# Patient Record
Sex: MALE | Race: WHITE | Employment: FULL TIME | URBAN - METROPOLITAN AREA
[De-identification: names, ages, dates, MRNs, and addresses within clinical notes are randomized per-mention and may not be internally consistent; named-entity substitution may affect disease eponyms.]

---

## 2018-04-17 ENCOUNTER — LAB ENCOUNTER (OUTPATIENT)
Dept: LAB | Age: 43
End: 2018-04-17
Attending: FAMILY MEDICINE
Payer: COMMERCIAL

## 2018-04-17 ENCOUNTER — APPOINTMENT (OUTPATIENT)
Dept: LAB | Age: 43
End: 2018-04-17
Attending: FAMILY MEDICINE
Payer: COMMERCIAL

## 2018-04-17 ENCOUNTER — HOSPITAL ENCOUNTER (OUTPATIENT)
Dept: GENERAL RADIOLOGY | Age: 43
Discharge: HOME | End: 2018-04-17
Attending: FAMILY MEDICINE
Payer: COMMERCIAL

## 2018-04-17 ENCOUNTER — OFFICE VISIT (OUTPATIENT)
Dept: FAMILY MEDICINE CLINIC | Facility: CLINIC | Age: 43
End: 2018-04-17

## 2018-04-17 VITALS
DIASTOLIC BLOOD PRESSURE: 110 MMHG | HEIGHT: 72 IN | WEIGHT: 273.63 LBS | BODY MASS INDEX: 37.06 KG/M2 | HEART RATE: 78 BPM | SYSTOLIC BLOOD PRESSURE: 162 MMHG | RESPIRATION RATE: 16 BRPM | TEMPERATURE: 98 F

## 2018-04-17 DIAGNOSIS — G47.33 OBSTRUCTIVE SLEEP APNEA SYNDROME: ICD-10-CM

## 2018-04-17 DIAGNOSIS — I10 ESSENTIAL HYPERTENSION: ICD-10-CM

## 2018-04-17 DIAGNOSIS — Z99.89 OSA ON CPAP: ICD-10-CM

## 2018-04-17 DIAGNOSIS — R73.9 HYPERGLYCEMIA: ICD-10-CM

## 2018-04-17 DIAGNOSIS — E66.01 SEVERE OBESITY (BMI 35.0-35.9 WITH COMORBIDITY) (HCC): ICD-10-CM

## 2018-04-17 DIAGNOSIS — G47.33 OSA ON CPAP: ICD-10-CM

## 2018-04-17 DIAGNOSIS — G43.009 MIGRAINE WITHOUT AURA AND WITHOUT STATUS MIGRAINOSUS, NOT INTRACTABLE: ICD-10-CM

## 2018-04-17 DIAGNOSIS — E78.2 MIXED HYPERLIPIDEMIA: ICD-10-CM

## 2018-04-17 DIAGNOSIS — I10 ESSENTIAL HYPERTENSION: Primary | ICD-10-CM

## 2018-04-17 PROCEDURE — 93010 ELECTROCARDIOGRAM REPORT: CPT | Performed by: FAMILY MEDICINE

## 2018-04-17 PROCEDURE — 99212 OFFICE O/P EST SF 10 MIN: CPT | Performed by: FAMILY MEDICINE

## 2018-04-17 PROCEDURE — 83036 HEMOGLOBIN GLYCOSYLATED A1C: CPT | Performed by: FAMILY MEDICINE

## 2018-04-17 PROCEDURE — 99215 OFFICE O/P EST HI 40 MIN: CPT | Performed by: FAMILY MEDICINE

## 2018-04-17 PROCEDURE — 80053 COMPREHEN METABOLIC PANEL: CPT

## 2018-04-17 PROCEDURE — 71046 X-RAY EXAM CHEST 2 VIEWS: CPT | Performed by: FAMILY MEDICINE

## 2018-04-17 PROCEDURE — 85025 COMPLETE CBC W/AUTO DIFF WBC: CPT

## 2018-04-17 PROCEDURE — 36415 COLL VENOUS BLD VENIPUNCTURE: CPT

## 2018-04-17 PROCEDURE — 81003 URINALYSIS AUTO W/O SCOPE: CPT

## 2018-04-17 PROCEDURE — 93005 ELECTROCARDIOGRAM TRACING: CPT

## 2018-04-17 RX ORDER — LISINOPRIL AND HYDROCHLOROTHIAZIDE 20; 12.5 MG/1; MG/1
1 TABLET ORAL DAILY
Qty: 30 TABLET | Refills: 3 | Status: SHIPPED | OUTPATIENT
Start: 2018-04-17 | End: 2018-05-15

## 2018-04-17 NOTE — PATIENT INSTRUCTIONS
DIET TIPS    Must try and decrease white flour products and carbohydrates such as less potatos, rice, tortillas, bread, pasta, etc. Eat smaller portions and avoid eating late at night, especially before bed.  Try t

## 2018-04-17 NOTE — PROGRESS NOTES
Patient ID: Wander Rob is a 37year old male. HPI  Patient presents with:  Blood Pressure  I have not seen him since 2016. He states he is not on any medications. He states they had an exam at work 10 days ago and his blood pressure was elevated.  10/18/2016   CO2 25 10/18/2016         Lab Results  Component Value Date    (H) 10/18/2016   BUN 18 10/18/2016   CREATSERUM 1.14 10/18/2016   BUNCREA 15.8 10/18/2016   ANIONGAP 7 10/18/2016   GFRAA >60 10/18/2016   GFRNAA >60 10/18/2016   C chest tightness and shortness of breath. Cardiovascular: Negative for chest pain, palpitations and leg swelling. Neurological: Positive for headaches (has migraines).          Past Medical History:   Diagnosis Date   • Heart murmur        History revie Oral Tab; Take 1 tablet by mouth daily. For high blood pressure  Let start him on Zestoretic. He would do labs me today along with a chest x-ray and EKG. He does not need to fast at this time as I would rather just get labs and start him on medication. (if given) as prescribed. Approach to treatment discussed and patient/family member understands and agrees to plan. We will decide on follow-up after I get the results back.     Alvina Thompson,   4/17/2018

## 2018-05-10 ENCOUNTER — PATIENT MESSAGE (OUTPATIENT)
Dept: FAMILY MEDICINE CLINIC | Facility: CLINIC | Age: 43
End: 2018-05-10

## 2018-05-11 NOTE — TELEPHONE ENCOUNTER
From: Emi Oshea  To: Genny Pagan DO  Sent: 5/10/2018 11:42 AM CDT  Subject: Visit Follow-up Question    I have an appointment next week. I am supposed to get lab work done before. Do I need anything or is something on file at the lab?

## 2018-05-12 ENCOUNTER — LAB ENCOUNTER (OUTPATIENT)
Dept: LAB | Age: 43
End: 2018-05-12
Attending: FAMILY MEDICINE
Payer: COMMERCIAL

## 2018-05-12 DIAGNOSIS — E78.2 MIXED HYPERLIPIDEMIA: ICD-10-CM

## 2018-05-12 DIAGNOSIS — I10 ESSENTIAL HYPERTENSION: ICD-10-CM

## 2018-05-12 PROCEDURE — 80061 LIPID PANEL: CPT

## 2018-05-12 PROCEDURE — 80048 BASIC METABOLIC PNL TOTAL CA: CPT

## 2018-05-12 PROCEDURE — 36415 COLL VENOUS BLD VENIPUNCTURE: CPT

## 2018-05-15 ENCOUNTER — OFFICE VISIT (OUTPATIENT)
Dept: FAMILY MEDICINE CLINIC | Facility: CLINIC | Age: 43
End: 2018-05-15

## 2018-05-15 VITALS
BODY MASS INDEX: 36.16 KG/M2 | DIASTOLIC BLOOD PRESSURE: 78 MMHG | TEMPERATURE: 99 F | SYSTOLIC BLOOD PRESSURE: 108 MMHG | HEART RATE: 83 BPM | HEIGHT: 72 IN | WEIGHT: 267 LBS

## 2018-05-15 DIAGNOSIS — E66.01 SEVERE OBESITY (BMI 35.0-35.9 WITH COMORBIDITY) (HCC): ICD-10-CM

## 2018-05-15 DIAGNOSIS — R73.9 HYPERGLYCEMIA: Primary | ICD-10-CM

## 2018-05-15 DIAGNOSIS — I10 ESSENTIAL HYPERTENSION: ICD-10-CM

## 2018-05-15 DIAGNOSIS — E78.2 MIXED HYPERLIPIDEMIA: ICD-10-CM

## 2018-05-15 PROCEDURE — 99212 OFFICE O/P EST SF 10 MIN: CPT | Performed by: FAMILY MEDICINE

## 2018-05-15 PROCEDURE — 99214 OFFICE O/P EST MOD 30 MIN: CPT | Performed by: FAMILY MEDICINE

## 2018-05-15 RX ORDER — LISINOPRIL 20 MG/1
20 TABLET ORAL DAILY
Qty: 30 TABLET | Refills: 3 | Status: SHIPPED | OUTPATIENT
Start: 2018-05-15 | End: 2018-06-15

## 2018-05-15 NOTE — PROGRESS NOTES
Patient ID: Ariane Coffey is a 37year old male. HPI  Patient presents with: Follow - Up  Sugars in the prediabetic area. He is already losing weight though. He will be calling the sleep apnea placed to get a new mask.     He states that the [facility]  05/12/2018   K 4.3 05/12/2018    05/12/2018   CO2 28 05/12/2018   OSMOCALC 291 05/12/2018         Lab Results  Component Value Date   COLORUR Yellow 04/17/2018   CLARITY Clear 04/17/2018   SPECGRAVITY 1.018 04/17/2018   GLUUR Negative 04/17/ cough and shortness of breath. Cardiovascular: Negative for chest pain, palpitations and leg swelling. Past Medical History:   Diagnosis Date   • Heart murmur        History reviewed. No pertinent surgical history.        Current Outpatient Presc start going to the gym and exercising. Try to avoid too many carbohydrates. Severe obesity (BMI 35.0-35.9 with comorbidity) ([facility] [address]    Reviewed all the labs at length.     Referrals (if applicable)  No orders of the defined types were placed in this encount

## 2018-05-17 NOTE — PROGRESS NOTES
jean paul message with MD recommendation sent to pt.   RACHAEL    6/15/2018  8:40 AM    Deshawn Hager DO         [facility]

## 2018-06-09 ENCOUNTER — APPOINTMENT (OUTPATIENT)
Dept: LAB | Age: 43
End: 2018-06-09
Attending: FAMILY MEDICINE
Payer: COMMERCIAL

## 2018-06-09 DIAGNOSIS — I10 ESSENTIAL HYPERTENSION: ICD-10-CM

## 2018-06-09 PROCEDURE — 80048 BASIC METABOLIC PNL TOTAL CA: CPT

## 2018-06-09 PROCEDURE — 36415 COLL VENOUS BLD VENIPUNCTURE: CPT

## 2018-06-15 ENCOUNTER — OFFICE VISIT (OUTPATIENT)
Dept: FAMILY MEDICINE CLINIC | Facility: CLINIC | Age: 43
End: 2018-06-15

## 2018-06-15 VITALS
DIASTOLIC BLOOD PRESSURE: 83 MMHG | SYSTOLIC BLOOD PRESSURE: 118 MMHG | WEIGHT: 269.38 LBS | TEMPERATURE: 99 F | HEART RATE: 83 BPM | BODY MASS INDEX: 36.49 KG/M2 | HEIGHT: 72 IN | RESPIRATION RATE: 16 BRPM

## 2018-06-15 DIAGNOSIS — G47.33 OBSTRUCTIVE SLEEP APNEA SYNDROME: ICD-10-CM

## 2018-06-15 DIAGNOSIS — I10 ESSENTIAL HYPERTENSION: Primary | ICD-10-CM

## 2018-06-15 DIAGNOSIS — E66.01 SEVERE OBESITY (BMI 35.0-35.9 WITH COMORBIDITY) (HCC): ICD-10-CM

## 2018-06-15 DIAGNOSIS — E78.2 MIXED HYPERLIPIDEMIA: ICD-10-CM

## 2018-06-15 PROCEDURE — 99214 OFFICE O/P EST MOD 30 MIN: CPT | Performed by: FAMILY MEDICINE

## 2018-06-15 PROCEDURE — 99212 OFFICE O/P EST SF 10 MIN: CPT | Performed by: FAMILY MEDICINE

## 2018-06-15 RX ORDER — LISINOPRIL 20 MG/1
20 TABLET ORAL DAILY
Qty: 90 TABLET | Refills: 1 | Status: SHIPPED | OUTPATIENT
Start: 2018-06-15 | End: 2019-02-18

## 2018-06-15 NOTE — PROGRESS NOTES
Patient ID: Silvia Nesbitt is a 37year old male. HPI  Patient presents with:  Hypertension    He is doing wonderful with lisinopril. He has no cough. He and his wife are both trying to work on eating better and living a healthy lifestyle.   He is try 06/09/2018         Lab Results  Component Value Date   COLORUR Yellow 04/17/2018   CLARITY Clear 04/17/2018   SPECGRAVITY 1.018 04/17/2018   GLUUR Negative 04/17/2018   BILUR Negative 04/17/2018   KETUR Negative 04/17/2018   BLOODURINE Negative 04/17/2018 20 MG Oral Tab Take 1 tablet (20 mg total) by mouth daily.  For high blood pressure Disp: 30 tablet Rfl: 3     Allergies:No Known Allergies   PHYSICAL EXAM:   Physical Exam  Blood pressure 137/90, pulse 83, temperature 98.7 °F (37.1 °C), temperature source in lowering cholesterol as well. At lunch and dinner, you are BETTER OFF eating lean pieces of meat such as fish, chicken, ham, turkey, pork and more vegetables along with it.      AVOID too many white flour products and carbohydrates such as bread, pasta

## 2019-02-18 DIAGNOSIS — I10 ESSENTIAL HYPERTENSION: ICD-10-CM

## 2019-02-20 RX ORDER — LISINOPRIL 20 MG/1
20 TABLET ORAL DAILY
Qty: 90 TABLET | Refills: 0 | Status: SHIPPED | OUTPATIENT
Start: 2019-02-20 | End: 2019-07-03

## 2019-02-21 NOTE — TELEPHONE ENCOUNTER
Refilled times 1 but needs appointment before the next prescription will be filled. Please call patient and set up an office visit as overdue. Was supposed to see me in September.   Please make an appointment for follow-up exam.

## 2019-03-02 ENCOUNTER — TELEPHONE (OUTPATIENT)
Dept: FAMILY MEDICINE CLINIC | Facility: CLINIC | Age: 44
End: 2019-03-02

## 2019-03-02 RX ORDER — OSELTAMIVIR PHOSPHATE 75 MG/1
75 CAPSULE ORAL 2 TIMES DAILY
Qty: 10 CAPSULE | Refills: 0 | Status: SHIPPED | OUTPATIENT
Start: 2019-03-02 | End: 2019-03-07

## 2019-03-02 NOTE — TELEPHONE ENCOUNTER
Received page about wife and he also got on phone. Has same symptoms. Fevers, chills, body aches for 2 days. Asking for tamiflu. I sent it to the pharmacy. If worsening symptoms, needs to go to the ER.

## 2019-03-08 ENCOUNTER — NURSE TRIAGE (OUTPATIENT)
Dept: OTHER | Age: 44
End: 2019-03-08

## 2019-03-08 ENCOUNTER — HOSPITAL ENCOUNTER (OUTPATIENT)
Dept: GENERAL RADIOLOGY | Age: 44
Discharge: HOME | End: 2019-03-08
Attending: FAMILY MEDICINE
Payer: COMMERCIAL

## 2019-03-08 ENCOUNTER — OFFICE VISIT (OUTPATIENT)
Dept: FAMILY MEDICINE CLINIC | Facility: CLINIC | Age: 44
End: 2019-03-08
Payer: COMMERCIAL

## 2019-03-08 VITALS
HEIGHT: 72 IN | SYSTOLIC BLOOD PRESSURE: 122 MMHG | DIASTOLIC BLOOD PRESSURE: 98 MMHG | TEMPERATURE: 101 F | RESPIRATION RATE: 16 BRPM | WEIGHT: 259.81 LBS | BODY MASS INDEX: 35.19 KG/M2 | HEART RATE: 107 BPM

## 2019-03-08 DIAGNOSIS — M79.10 MYALGIA: ICD-10-CM

## 2019-03-08 DIAGNOSIS — R11.11 VOMITING WITHOUT NAUSEA, INTRACTABILITY OF VOMITING NOT SPECIFIED, UNSPECIFIED VOMITING TYPE: ICD-10-CM

## 2019-03-08 DIAGNOSIS — R05.9 COUGH: ICD-10-CM

## 2019-03-08 DIAGNOSIS — R50.9 FEVER, UNSPECIFIED FEVER CAUSE: ICD-10-CM

## 2019-03-08 DIAGNOSIS — R50.9 FEVER, UNSPECIFIED FEVER CAUSE: Primary | ICD-10-CM

## 2019-03-08 PROCEDURE — 99213 OFFICE O/P EST LOW 20 MIN: CPT | Performed by: FAMILY MEDICINE

## 2019-03-08 PROCEDURE — 71046 X-RAY EXAM CHEST 2 VIEWS: CPT | Performed by: FAMILY MEDICINE

## 2019-03-08 PROCEDURE — 99212 OFFICE O/P EST SF 10 MIN: CPT | Performed by: FAMILY MEDICINE

## 2019-03-08 RX ORDER — AZITHROMYCIN 250 MG/1
TABLET, FILM COATED ORAL
Qty: 6 TABLET | Refills: 0 | Status: SHIPPED | OUTPATIENT
Start: 2019-03-08 | End: 2019-03-13

## 2019-03-08 NOTE — TELEPHONE ENCOUNTER
Action Requested: Summary for Provider     []  Critical Lab, Recommendations Needed  [] Need Additional Advice  [x]   FYI    []   Need Orders  [] Need Medications Sent to Pharmacy  []  Other     SUMMARY: Spouse called indicated that last week patient had t

## 2019-07-03 DIAGNOSIS — I10 ESSENTIAL HYPERTENSION: ICD-10-CM

## 2019-07-05 RX ORDER — LISINOPRIL 20 MG/1
20 TABLET ORAL DAILY
Qty: 30 TABLET | Refills: 1 | Status: SHIPPED | OUTPATIENT
Start: 2019-07-05 | End: 2019-12-27

## 2019-07-05 NOTE — TELEPHONE ENCOUNTER
Patient needs to schedule an appt with Dr Domonique Villeda for a general physical.  Sent in enough for a month's supply. Will need to be seen in order to get any further refills.

## 2019-11-04 DIAGNOSIS — I10 ESSENTIAL HYPERTENSION: ICD-10-CM

## 2019-11-05 RX ORDER — LISINOPRIL 20 MG/1
TABLET ORAL
Qty: 90 TABLET | Refills: 0 | Status: SHIPPED | OUTPATIENT
Start: 2019-11-05 | End: 2019-11-25

## 2019-11-06 NOTE — TELEPHONE ENCOUNTER
Refilled times 1 but needs appointment before the next prescription will be filled. Please call patient and set up an office visit as overdue. Long overdue for blood pressure follow-up. Please make him an appointment.

## 2019-11-25 DIAGNOSIS — I10 ESSENTIAL HYPERTENSION: ICD-10-CM

## 2019-11-26 RX ORDER — LISINOPRIL 20 MG/1
TABLET ORAL
Qty: 30 TABLET | Refills: 0 | Status: SHIPPED | OUTPATIENT
Start: 2019-11-26 | End: 2019-12-27

## 2019-11-26 NOTE — TELEPHONE ENCOUNTER
ARMAND please assist with scheduling 6 month f/u appt    Please review; protocol failed.  D/t labs and appt  Requested Prescriptions     Pending Prescriptions Disp Refills   • LISINOPRIL 20 MG Oral Tab [Pharmacy Med Name: LISINOPRIL 20 MG TABLET] 30 tablet 0

## 2019-12-27 ENCOUNTER — OFFICE VISIT (OUTPATIENT)
Dept: FAMILY MEDICINE CLINIC | Facility: CLINIC | Age: 44
End: 2019-12-27
Payer: COMMERCIAL

## 2019-12-27 VITALS
BODY MASS INDEX: 36.03 KG/M2 | TEMPERATURE: 99 F | HEIGHT: 72 IN | DIASTOLIC BLOOD PRESSURE: 108 MMHG | WEIGHT: 266 LBS | HEART RATE: 67 BPM | SYSTOLIC BLOOD PRESSURE: 150 MMHG

## 2019-12-27 DIAGNOSIS — G47.33 OBSTRUCTIVE SLEEP APNEA SYNDROME: ICD-10-CM

## 2019-12-27 DIAGNOSIS — E78.2 MIXED HYPERLIPIDEMIA: ICD-10-CM

## 2019-12-27 DIAGNOSIS — Z00.00 ADULT GENERAL MEDICAL EXAM: Primary | ICD-10-CM

## 2019-12-27 DIAGNOSIS — I10 ESSENTIAL HYPERTENSION: ICD-10-CM

## 2019-12-27 DIAGNOSIS — G43.009 MIGRAINE WITHOUT AURA AND WITHOUT STATUS MIGRAINOSUS, NOT INTRACTABLE: ICD-10-CM

## 2019-12-27 DIAGNOSIS — E66.01 SEVERE OBESITY (BMI 35.0-35.9 WITH COMORBIDITY) (HCC): ICD-10-CM

## 2019-12-27 DIAGNOSIS — R73.9 HYPERGLYCEMIA: ICD-10-CM

## 2019-12-27 PROCEDURE — 99396 PREV VISIT EST AGE 40-64: CPT | Performed by: FAMILY MEDICINE

## 2019-12-27 RX ORDER — LISINOPRIL 20 MG/1
TABLET ORAL
Qty: 90 TABLET | Refills: 0 | Status: SHIPPED | OUTPATIENT
Start: 2019-12-27 | End: 2020-06-26

## 2019-12-27 NOTE — PROGRESS NOTES
Patient ID: Emi Oshea is a 40year old male. HPI  Patient presents with:  Hypertension: follow up/refills    Long overdue for physical exam.  Pt is  and does not smoke. 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  He temporarily moved to [facility] 06/25/2011    BASO 0.0 06/25/2011    NEPERCENT 49 04/17/2018    LYPERCENT 38 04/17/2018    MOPERCENT 8 04/17/2018    EOPERCENT 5 04/17/2018    BAPERCENT 0 04/17/2018    NE 3.5 04/17/2018    LYMABS 2.7 04/17/2018    MOABSO 0.6 04/17/2018    EOABSO 0.4 04/17 Date Value   10/18/2016 1.10     TSH (S) (uIU/mL)   Date Value   06/25/2011 1.16       Lab Results   Component Value Date    PSA 0.3 05/23/2008    TOTPSASCREEN 0.4 08/06/2011       =======================================================    Wt Readings fr on file        Inability: Not on file      Transportation needs:        Medical: Not on file        Non-medical: Not on file    Tobacco Use      Smoking status: Never Smoker      Smokeless tobacco: Never Used    Substance and Sexual Activity      Alcohol u membrane and ear canal normal.   Nose: No mucosal edema or rhinorrhea. Mouth/Throat: Oropharynx is clear and moist and mucous membranes are normal.  Eyes: Conjunctivae and EOM are normal. Pupils are equal, round, and reactive to light.    Neck: Normal rang intractable  Rarely gets headaches. States no medication needed  Hyperglycemia  Must try to lose some weight. Diet instructions below.   Severe obesity (BMI 35.0-35.9 with comorbidity) (HCC)  -     HEMOGLOBIN A1C; Future    I did tell him considering that attest that this documentation has been prepared under the direction and in the presence of Chelsie Morales DO.    Electronically Signed: Yumiko King, 12/27/2019, 3:15 PM.    IDeshawn DO,  personally performed the services described in this docu

## 2019-12-28 ENCOUNTER — LAB ENCOUNTER (OUTPATIENT)
Dept: LAB | Age: 44
End: 2019-12-28
Attending: FAMILY MEDICINE
Payer: COMMERCIAL

## 2019-12-28 DIAGNOSIS — E66.01 SEVERE OBESITY (BMI 35.0-35.9 WITH COMORBIDITY) (HCC): ICD-10-CM

## 2019-12-28 DIAGNOSIS — Z00.00 ADULT GENERAL MEDICAL EXAM: ICD-10-CM

## 2019-12-28 DIAGNOSIS — G47.33 OBSTRUCTIVE SLEEP APNEA SYNDROME: ICD-10-CM

## 2019-12-28 PROCEDURE — 83036 HEMOGLOBIN GLYCOSYLATED A1C: CPT

## 2019-12-28 PROCEDURE — 84443 ASSAY THYROID STIM HORMONE: CPT

## 2019-12-28 PROCEDURE — 80061 LIPID PANEL: CPT

## 2019-12-28 PROCEDURE — 85025 COMPLETE CBC W/AUTO DIFF WBC: CPT

## 2019-12-28 PROCEDURE — 36415 COLL VENOUS BLD VENIPUNCTURE: CPT

## 2019-12-28 PROCEDURE — 80053 COMPREHEN METABOLIC PANEL: CPT

## 2020-06-20 DIAGNOSIS — I10 ESSENTIAL HYPERTENSION: ICD-10-CM

## 2020-06-25 ENCOUNTER — PATIENT MESSAGE (OUTPATIENT)
Dept: FAMILY MEDICINE CLINIC | Facility: CLINIC | Age: 45
End: 2020-06-25

## 2020-06-25 ENCOUNTER — NURSE TRIAGE (OUTPATIENT)
Dept: FAMILY MEDICINE CLINIC | Facility: CLINIC | Age: 45
End: 2020-06-25

## 2020-06-25 NOTE — TELEPHONE ENCOUNTER
From: Geoffrey Erickson  To: Saeid Seo DO  Sent: 6/25/2020 4:54 PM CDT  Subject: Non-Urgent Medical Question    I had a call set for 415 today regarding a refill. I may have missed but did not see any missed calls. I also wanted to discuss a pain in toe.

## 2020-06-25 NOTE — TELEPHONE ENCOUNTER
Action Requested: Summary for Provider     []  Critical Lab, Recommendations Needed  [] Need Additional Advice  []   FYI    []   Need Orders  [x] Need Medications Sent to Pharmacy  []  Other     SUMMARY: per patient he is experiencing a gout flare up to hi

## 2020-06-25 NOTE — TELEPHONE ENCOUNTER
Please triage message         6/25/20 4:54 PM   I had a call set for 415 today regarding a refill. I may have missed but did not see any missed calls. I also wanted to discuss a pain in toe.  I believe it may be a gout flare up and wanted to get a prescri

## 2020-06-26 ENCOUNTER — VIRTUAL PHONE E/M (OUTPATIENT)
Dept: FAMILY MEDICINE CLINIC | Facility: CLINIC | Age: 45
End: 2020-06-26
Payer: COMMERCIAL

## 2020-06-26 DIAGNOSIS — M10.9 PODAGRA: ICD-10-CM

## 2020-06-26 DIAGNOSIS — I10 ESSENTIAL HYPERTENSION: ICD-10-CM

## 2020-06-26 DIAGNOSIS — M79.675 GREAT TOE PAIN, LEFT: Primary | ICD-10-CM

## 2020-06-26 PROCEDURE — 99214 OFFICE O/P EST MOD 30 MIN: CPT | Performed by: FAMILY MEDICINE

## 2020-06-26 RX ORDER — LISINOPRIL 20 MG/1
TABLET ORAL
Qty: 90 TABLET | Refills: 0 | Status: SHIPPED | OUTPATIENT
Start: 2020-06-26 | End: 2020-09-20

## 2020-06-26 RX ORDER — DEXAMETHASONE 4 MG/1
TABLET ORAL
Qty: 5 TABLET | Refills: 0 | Status: SHIPPED | OUTPATIENT
Start: 2020-06-26

## 2020-06-26 RX ORDER — COLCHICINE 0.6 MG/1
TABLET ORAL
Qty: 30 TABLET | Refills: 0 | Status: SHIPPED | OUTPATIENT
Start: 2020-06-26 | End: 2020-07-18

## 2020-06-26 NOTE — TELEPHONE ENCOUNTER
I will get a hold of him tomorrow instead. It will be a virtual visit. He is on the schedule already. I just did not have time to call him today as we got so busy at work and called him too many times and he did not .

## 2020-06-26 NOTE — PROGRESS NOTES
TELEPHONE VISIT PROGRESS NOTE  Todays date: 6/26/2020 12:02 PM        Most recent Nurse Triage message / Julia Marroquin message from patient:      Janet Hernandez RN   Registered Nurse   Registered Nurse   Telephone Encounter   Signed   Encounter Date:  6/25/2020 on the phone with me is talking to the patient. This also goes for family members who would rather speak to me on behalf of their Setswana-speaking (or another foreign language) patient.   The patient will give consent but I will be speaking to the person No [x]      • Travel: Yes []     No [x]      • Sick contacts: Yes []     No [x]       • Occupation/large gatherings:  N/A  • Other:  Gout flare up: left great toe pain      Treatments tried: N/A    Symptoms since onset: Improving []   Worsening  []   Unc Allergies:  No Known Allergies   Reviewed Past Medical History:  Past Medical History:   Diagnosis Date   • Heart murmur       Reviewed Family History:  No family history on file.     Reviewed Social History:  Social History    Tobacco Use      Smoking stat

## 2020-06-28 RX ORDER — LISINOPRIL 20 MG/1
TABLET ORAL
Qty: 90 TABLET | Refills: 0 | OUTPATIENT
Start: 2020-06-28

## 2020-07-18 DIAGNOSIS — M10.9 PODAGRA: ICD-10-CM

## 2020-07-18 DIAGNOSIS — M79.675 GREAT TOE PAIN, LEFT: ICD-10-CM

## 2020-07-18 RX ORDER — COLCHICINE 0.6 MG/1
TABLET ORAL
Qty: 30 TABLET | Refills: 0 | Status: SHIPPED | OUTPATIENT
Start: 2020-07-18 | End: 2021-02-19 | Stop reason: ALTCHOICE

## 2020-09-16 DIAGNOSIS — I10 ESSENTIAL HYPERTENSION: ICD-10-CM

## 2020-09-20 RX ORDER — LISINOPRIL 20 MG/1
TABLET ORAL
Qty: 90 TABLET | Refills: 0 | Status: SHIPPED | OUTPATIENT
Start: 2020-09-20 | End: 2020-12-21

## 2020-12-21 DIAGNOSIS — I10 ESSENTIAL HYPERTENSION: ICD-10-CM

## 2020-12-21 RX ORDER — LISINOPRIL 20 MG/1
TABLET ORAL
Qty: 30 TABLET | Refills: 0 | Status: SHIPPED | OUTPATIENT
Start: 2020-12-21 | End: 2021-02-19

## 2021-01-08 ENCOUNTER — OFFICE VISIT (OUTPATIENT)
Dept: URGENT CARE | Facility: URGENT CARE | Age: 46
End: 2021-01-08
Payer: COMMERCIAL

## 2021-01-08 VITALS
OXYGEN SATURATION: 98 % | TEMPERATURE: 98.8 F | HEIGHT: 72 IN | RESPIRATION RATE: 16 BRPM | SYSTOLIC BLOOD PRESSURE: 118 MMHG | HEART RATE: 67 BPM | WEIGHT: 272 LBS | BODY MASS INDEX: 36.84 KG/M2 | DIASTOLIC BLOOD PRESSURE: 76 MMHG

## 2021-01-08 DIAGNOSIS — R11.0 NAUSEA: ICD-10-CM

## 2021-01-08 DIAGNOSIS — G43.019 INTRACTABLE MIGRAINE WITHOUT AURA AND WITHOUT STATUS MIGRAINOSUS: Primary | ICD-10-CM

## 2021-01-08 PROCEDURE — 99204 OFFICE O/P NEW MOD 45 MIN: CPT | Mod: 25 | Performed by: FAMILY MEDICINE

## 2021-01-08 PROCEDURE — 96372 THER/PROPH/DIAG INJ SC/IM: CPT | Performed by: FAMILY MEDICINE

## 2021-01-08 RX ORDER — LISINOPRIL 20 MG/1
TABLET ORAL
COMMUNITY
Start: 2020-12-21

## 2021-01-08 RX ORDER — ONDANSETRON 8 MG/1
8 TABLET, FILM COATED ORAL EVERY 8 HOURS PRN
Qty: 15 TABLET | Refills: 1 | Status: SHIPPED | OUTPATIENT
Start: 2021-01-08

## 2021-01-08 RX ORDER — KETOROLAC TROMETHAMINE 30 MG/ML
60 INJECTION, SOLUTION INTRAMUSCULAR; INTRAVENOUS ONCE
Status: COMPLETED | OUTPATIENT
Start: 2021-01-08 | End: 2021-01-08

## 2021-01-08 RX ADMIN — KETOROLAC TROMETHAMINE 60 MG: 30 INJECTION, SOLUTION INTRAMUSCULAR; INTRAVENOUS at 17:50

## 2021-01-08 ASSESSMENT — MIFFLIN-ST. JEOR: SCORE: 2156.78

## 2021-01-08 NOTE — NURSING NOTE
Clinic Administered Medication Documentation      Injectable Medication Documentation    Patient was given Ketorolac Tromethamine (Toradol). Prior to medication administration, verified patients identity using patient s name and date of birth. Please see MAR and medication order for additional information. Patient instructed to remain in clinic for 15 minutes.      Was entire vial of medication used? Yes  Vial/Syringe: Single dose vial  Expiration Date:  1/31/2022  Was this medication supplied by the patient? No,  Gave a 60 mg/2 ml - given in RUG .Evgeny Fowler CMA

## 2021-01-08 NOTE — PROGRESS NOTES
SUBJECTIVE:   Franki Estrada is a 45 year old male with a history of migraines.  He is presenting with a chief complaint of hours of off-and-on bilateral forehead tightness, severe nausea, a sensation of clogged ears.  There has also been light sensitivity over the past 3-4 days.  .  Onset of symptoms was 10 days ago. Symptoms are worse with any car rides.   The symptoms started while driving from [place] to the [facility].       Course of illness is persisting.    Current and Associated symptoms: as listed above.   Treatment measures tried include vertigo exercises (without any improved symptoms) and Dramamine (without any relief).  .  .  Predisposing factors include history of migraines.    Head movements can worsen the symptoms.    No neck stiffness.   No ringing in the ears  No decreased hearing  No balance problems  No recent cold symptoms.   No fevers    Patient has a history of migraines.        Past Medical History:    Hypertension  Migraines  Obstructive Sleep Apnea      Current Outpatient Medications   Medication Sig Dispense Refill     lisinopril (ZESTRIL) 20 MG tablet        Social History     Tobacco Use     Smoking status: Never Smoker     Smokeless tobacco: Never Used   Substance Use Topics     Alcohol use: Yes     Patient recently moved to [place] from [place].  He has no primary care provider.     ROS:  CONSTITUTIONAL:negative for fevers.   NEURO: positive for dizziness.      OBJECTIVE:  /76 (BP Location: Right arm, Patient Position: Chair, Cuff Size: Adult Large)   Pulse 67   Temp 98.8  F (37.1  C) (Oral)   Resp 16   Ht 1.829 m (6')   Wt 123.4 kg (272 lb)   SpO2 98%   BMI 36.89 kg/m    GENERAL APPEARANCE: healthy, alert and patient feels uncomfortable and nauseous.  No acute respiratory distress.    EYES: Eyes grossly normal to inspection except for the presence of photophobia in the left eye.    HENT: ear canals and TM's normal.    NEURO: Normal strength and tone, sensory  exam grossly normal, mentation intact, speech normal, gait normal including heel/toe/tandem walking, mentation intact, cranial nerves 2-12 intact, Romberg negative, rapid alternating movements normal, light touch normal, normal strength throughout and DTRs 1/4 at the arms and legs.    SKIN: no suspicious lesions or rashes.  No cyanosis.      ASSESSMENT:  Migraine (intractable) without aura.  Nausea    PLAN:  Patient received Ketorolac 60 mg IM injection during this clinic encounter.  Patient felt about 20% better after receiving this medication.      Outpatient Rx:  Zofran (Ondansetron).     follow up with a neurologist for further evaluation.  (Patient does not have a primary care provider and recently moved to [place].)        Keyon Shahid MD

## 2021-01-09 NOTE — PATIENT INSTRUCTIONS
Go to the emergency room if the headache keeps worsening or if you start vomiting a lot.

## 2021-01-25 DIAGNOSIS — I10 ESSENTIAL HYPERTENSION: ICD-10-CM

## 2021-01-26 RX ORDER — LISINOPRIL 20 MG/1
TABLET ORAL
Qty: 30 TABLET | Refills: 0 | OUTPATIENT
Start: 2021-01-26

## 2021-01-26 NOTE — TELEPHONE ENCOUNTER
He moved to [place] for 3 years. We  just cannot keep sending medications for him. Patient will need to see a doctor there. Bev Mendez stated no more refills until seen. He will need to go to the immediate care or be seen by us. .  We can not do a virtual

## 2021-01-26 NOTE — TELEPHONE ENCOUNTER
Please see 12/21/20 encounter. The patient was called and a [facility] message was sent. The patient did not respond. I sent a letter in [facility] and in the mail to the patient. I also left a message to call us back. Encounter closed.   This was a

## 2021-02-19 ENCOUNTER — EKG ENCOUNTER (OUTPATIENT)
Dept: LAB | Age: 46
End: 2021-02-19
Attending: FAMILY MEDICINE
Payer: COMMERCIAL

## 2021-02-19 ENCOUNTER — LAB ENCOUNTER (OUTPATIENT)
Dept: LAB | Age: 46
End: 2021-02-19
Attending: FAMILY MEDICINE
Payer: COMMERCIAL

## 2021-02-19 ENCOUNTER — OFFICE VISIT (OUTPATIENT)
Dept: FAMILY MEDICINE CLINIC | Facility: CLINIC | Age: 46
End: 2021-02-19
Payer: COMMERCIAL

## 2021-02-19 ENCOUNTER — HOSPITAL ENCOUNTER (OUTPATIENT)
Dept: GENERAL RADIOLOGY | Age: 46
Discharge: HOME | End: 2021-02-19
Attending: FAMILY MEDICINE
Payer: COMMERCIAL

## 2021-02-19 VITALS
DIASTOLIC BLOOD PRESSURE: 100 MMHG | HEIGHT: 72 IN | WEIGHT: 273.81 LBS | HEART RATE: 71 BPM | SYSTOLIC BLOOD PRESSURE: 137 MMHG | BODY MASS INDEX: 37.09 KG/M2 | TEMPERATURE: 98 F

## 2021-02-19 DIAGNOSIS — R51.9 HEADACHE, UNSPECIFIED HEADACHE TYPE: ICD-10-CM

## 2021-02-19 DIAGNOSIS — J30.2 SEASONAL ALLERGIES: ICD-10-CM

## 2021-02-19 DIAGNOSIS — I10 ESSENTIAL HYPERTENSION: ICD-10-CM

## 2021-02-19 DIAGNOSIS — R42 VERTIGO: ICD-10-CM

## 2021-02-19 DIAGNOSIS — J32.9 SINUSITIS, UNSPECIFIED CHRONICITY, UNSPECIFIED LOCATION: ICD-10-CM

## 2021-02-19 DIAGNOSIS — R11.0 NAUSEA: Primary | ICD-10-CM

## 2021-02-19 DIAGNOSIS — R73.9 HYPERGLYCEMIA: ICD-10-CM

## 2021-02-19 DIAGNOSIS — G43.009 MIGRAINE WITHOUT AURA AND WITHOUT STATUS MIGRAINOSUS, NOT INTRACTABLE: ICD-10-CM

## 2021-02-19 LAB
ALBUMIN SERPL-MCNC: 3.7 G/DL (ref 3.4–5)
ALBUMIN/GLOB SERPL: 1 {RATIO} (ref 1–2)
ALP LIVER SERPL-CCNC: 74 U/L
ALT SERPL-CCNC: 59 U/L
ANION GAP SERPL CALC-SCNC: 3 MMOL/L (ref 0–18)
AST SERPL-CCNC: 23 U/L (ref 15–37)
BASOPHILS # BLD AUTO: 0.06 X10(3) UL (ref 0–0.2)
BASOPHILS NFR BLD AUTO: 0.9 %
BILIRUB SERPL-MCNC: 0.6 MG/DL (ref 0.1–2)
BUN BLD-MCNC: 15 MG/DL (ref 7–18)
BUN/CREAT SERPL: 13.6 (ref 10–20)
CALCIUM BLD-MCNC: 9 MG/DL (ref 8.5–10.1)
CHLORIDE SERPL-SCNC: 107 MMOL/L (ref 98–112)
CHOLEST SMN-MCNC: 199 MG/DL (ref ?–200)
CO2 SERPL-SCNC: 30 MMOL/L (ref 21–32)
CREAT BLD-MCNC: 1.1 MG/DL
DEPRECATED RDW RBC AUTO: 41.3 FL (ref 35.1–46.3)
EOSINOPHIL # BLD AUTO: 0.33 X10(3) UL (ref 0–0.7)
EOSINOPHIL NFR BLD AUTO: 5.1 %
ERYTHROCYTE [DISTWIDTH] IN BLOOD BY AUTOMATED COUNT: 13.2 % (ref 11–15)
EST. AVERAGE GLUCOSE BLD GHB EST-MCNC: 151 MG/DL (ref 68–126)
GLOBULIN PLAS-MCNC: 3.8 G/DL (ref 2.8–4.4)
GLUCOSE BLD-MCNC: 124 MG/DL (ref 70–99)
HBA1C MFR BLD HPLC: 6.9 % (ref ?–5.7)
HCT VFR BLD AUTO: 45.1 %
HDLC SERPL-MCNC: 38 MG/DL (ref 40–59)
HGB BLD-MCNC: 14.8 G/DL
IMM GRANULOCYTES # BLD AUTO: 0.01 X10(3) UL (ref 0–1)
IMM GRANULOCYTES NFR BLD: 0.2 %
LDLC SERPL CALC-MCNC: 137 MG/DL (ref ?–100)
LYMPHOCYTES # BLD AUTO: 2.41 X10(3) UL (ref 1–4)
LYMPHOCYTES NFR BLD AUTO: 37.6 %
M PROTEIN MFR SERPL ELPH: 7.5 G/DL (ref 6.4–8.2)
MCH RBC QN AUTO: 28.2 PG (ref 26–34)
MCHC RBC AUTO-ENTMCNC: 32.8 G/DL (ref 31–37)
MCV RBC AUTO: 86.1 FL
MONOCYTES # BLD AUTO: 0.59 X10(3) UL (ref 0.1–1)
MONOCYTES NFR BLD AUTO: 9.2 %
NEUTROPHILS # BLD AUTO: 3.01 X10 (3) UL (ref 1.5–7.7)
NEUTROPHILS # BLD AUTO: 3.01 X10(3) UL (ref 1.5–7.7)
NEUTROPHILS NFR BLD AUTO: 47 %
NONHDLC SERPL-MCNC: 161 MG/DL (ref ?–130)
OSMOLALITY SERPL CALC.SUM OF ELEC: 292 MOSM/KG (ref 275–295)
PATIENT FASTING Y/N/NP: YES
PATIENT FASTING Y/N/NP: YES
PLATELET # BLD AUTO: 266 10(3)UL (ref 150–450)
POTASSIUM SERPL-SCNC: 4.3 MMOL/L (ref 3.5–5.1)
RBC # BLD AUTO: 5.24 X10(6)UL
SODIUM SERPL-SCNC: 140 MMOL/L (ref 136–145)
TRIGL SERPL-MCNC: 120 MG/DL (ref 30–149)
TSI SER-ACNC: 0.94 MIU/ML (ref 0.36–3.74)
VLDLC SERPL CALC-MCNC: 24 MG/DL (ref 0–30)
WBC # BLD AUTO: 6.4 X10(3) UL (ref 4–11)

## 2021-02-19 PROCEDURE — 71046 X-RAY EXAM CHEST 2 VIEWS: CPT | Performed by: FAMILY MEDICINE

## 2021-02-19 PROCEDURE — 80053 COMPREHEN METABOLIC PANEL: CPT

## 2021-02-19 PROCEDURE — 83036 HEMOGLOBIN GLYCOSYLATED A1C: CPT

## 2021-02-19 PROCEDURE — 93010 ELECTROCARDIOGRAM REPORT: CPT | Performed by: FAMILY MEDICINE

## 2021-02-19 PROCEDURE — 3008F BODY MASS INDEX DOCD: CPT | Performed by: FAMILY MEDICINE

## 2021-02-19 PROCEDURE — 84443 ASSAY THYROID STIM HORMONE: CPT

## 2021-02-19 PROCEDURE — 93005 ELECTROCARDIOGRAM TRACING: CPT

## 2021-02-19 PROCEDURE — 80061 LIPID PANEL: CPT

## 2021-02-19 PROCEDURE — 3075F SYST BP GE 130 - 139MM HG: CPT | Performed by: FAMILY MEDICINE

## 2021-02-19 PROCEDURE — 3080F DIAST BP >= 90 MM HG: CPT | Performed by: FAMILY MEDICINE

## 2021-02-19 PROCEDURE — 85025 COMPLETE CBC W/AUTO DIFF WBC: CPT

## 2021-02-19 PROCEDURE — 99215 OFFICE O/P EST HI 40 MIN: CPT | Performed by: FAMILY MEDICINE

## 2021-02-19 PROCEDURE — 36415 COLL VENOUS BLD VENIPUNCTURE: CPT

## 2021-02-19 RX ORDER — ONDANSETRON HYDROCHLORIDE 8 MG/1
8 TABLET, FILM COATED ORAL
COMMUNITY
Start: 2021-01-08

## 2021-02-19 RX ORDER — LISINOPRIL 20 MG/1
TABLET ORAL
Qty: 90 TABLET | Refills: 1 | Status: SHIPPED | OUTPATIENT
Start: 2021-02-19

## 2021-02-19 RX ORDER — AMOXICILLIN AND CLAVULANATE POTASSIUM 875; 125 MG/1; MG/1
1 TABLET, FILM COATED ORAL 2 TIMES DAILY
Qty: 20 TABLET | Refills: 0 | Status: SHIPPED | OUTPATIENT
Start: 2021-02-19 | End: 2021-03-01

## 2021-02-19 RX ORDER — ONDANSETRON HYDROCHLORIDE 8 MG/1
8 TABLET, FILM COATED ORAL EVERY 8 HOURS PRN
COMMUNITY
Start: 2021-01-08 | End: 2021-02-19

## 2021-02-19 NOTE — PROGRESS NOTES
Patient ID: Tricia White is a 39year old male. HPI  Patient presents with: Follow - Up: HTN  Nausea: headaches x2 months    Last seen by me on 12/27/2019. 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      Pt c/o headache in the Respiratory: Positive for shortness of breath. Cardiovascular: Negative for chest pain. Gastrointestinal: Positive for nausea. Neurological: Positive for headaches.            Medical History:      Past Medical History:   Diagnosis Date   • Heart m daily for 10 days. He has quite a bit of inflammation of the turbinates on the right side could indicate a sinus infection. I wonder if this is what is causing his symptoms. We will Augmentin for 10 days.   Vertigo  -     CBC WITH DIFFERENTIAL WITH PLATE reviewed the chart and discharge instructions (if applicable) and agree that the record reflects my personal performance and is accurate and complete.   Hetal Barrett DO, 2/19/2021, 9:49 AM

## 2021-03-07 ENCOUNTER — HEALTH MAINTENANCE LETTER (OUTPATIENT)
Age: 46
End: 2021-03-07

## 2021-03-20 ENCOUNTER — MYC MEDICAL ADVICE (OUTPATIENT)
Dept: PEDIATRICS | Facility: CLINIC | Age: 46
End: 2021-03-20

## 2021-10-11 ENCOUNTER — HEALTH MAINTENANCE LETTER (OUTPATIENT)
Age: 46
End: 2021-10-11

## 2022-03-27 ENCOUNTER — HEALTH MAINTENANCE LETTER (OUTPATIENT)
Age: 47
End: 2022-03-27

## 2022-09-25 ENCOUNTER — HEALTH MAINTENANCE LETTER (OUTPATIENT)
Age: 47
End: 2022-09-25

## 2023-05-08 ENCOUNTER — HEALTH MAINTENANCE LETTER (OUTPATIENT)
Age: 48
End: 2023-05-08

## 2024-07-21 NOTE — PROGRESS NOTES
Subjective:   Yehuda Graves is a 49 year old male who presents for Physical (Lab order, sleep apnea, Testerone treatment )   Patient is a pleasant 49-year-old male new to this provider with past medical history consistent for obesity, hypertension, hyperlipidemia, migraine, and YULIYA.  Patient presents to office today for discussion on sleep apnea and medication refills.  Patient states just moved back from [facility] was there for 5 years. [facility] Was on testosterone therapy years ago both topical and IM injections in past. He went for one appt and was told to address YULIYA first at clinic in [place]. Has not been using cpap with outdated study, will reorder and order sleep consultation    Home sleep study.    Diet: Bad, knows he needs to eat better. Eats out 4 times per week. Wants to cut back on fatty foods.   Exercise: None. Educated on needs for healthy lifestyle.   Sleep: Has sleep apnea. 8 hours per night. Wakes up fatigued.   Stress: Sold house, living with mom while looking for home. Took paycut to move back. Junk food is outlet for him.   Social: , empty marilia 3 daughters 27/25/23. Lives in [place]  Sexually Active: Yes  Prophylaxis: No  Alcohol: 5 beers per week.  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   Vaccinations: UTD  Colon: Gi referral today.  PHQ9 score: 4            Past Medical History:    Heart murmur      History reviewed. No pertinent surgical history.     History/Other:    Chief Complaint Reviewed and Verified  Nursing Notes Reviewed and   Verified  Tobacco Reviewed  Allergies Reviewed  Medications Reviewed    Problem List Reviewed  Medical History Reviewed  Surgical History   Reviewed  Family History Reviewed  Social History Reviewed         Tobacco:  He has never smoked tobacco.    Current Outpatient Medications   Medication Sig Dispense Refill    amLODIPine 2.5 MG Oral Tab  Take 1 tablet (2.5 mg total) by mouth daily.      colchicine 0.6 MG Oral Tab Take 1 tablet (0.6 mg total) by mouth daily.      allopurinol 300 MG Oral Tab Take 1 tablet (300 mg total) by mouth daily.      lisinopril 20 MG Oral Tab TAKE 1 TABLET BY MOUTH DAILY. FOR HIGH BLOOD PRESSURE 90 tablet 1    Ondansetron HCl (ZOFRAN) 8 MG tablet Take 8 mg by mouth. (Patient not taking: Reported on 7/22/2024)           Review of Systems:  Review of Systems   Constitutional:  Positive for fatigue. Negative for activity change, chills, fever and unexpected weight change.   HENT: Negative.  Negative for congestion, ear pain, postnasal drip, sinus pain, sore throat and trouble swallowing.    Respiratory: Negative.  Negative for cough, shortness of breath and wheezing.    Cardiovascular: Negative.  Negative for chest pain and leg swelling.   Gastrointestinal:  Positive for blood in stool. Negative for abdominal pain, constipation, diarrhea, nausea and vomiting.   Endocrine: Negative.    Genitourinary: Negative.  Negative for difficulty urinating, dysuria and flank pain.   Musculoskeletal: Negative.  Negative for arthralgias, back pain and neck stiffness.   Skin: Negative.  Negative for color change and rash.   Neurological: Negative.  Negative for dizziness and headaches.   Hematological:  Negative for adenopathy.         Objective:   /88   Pulse 69   Temp 96.8 °F (36 °C)   Ht 6' (1.829 m)   Wt 261 lb 9.6 oz (118.7 kg)   SpO2 98%   BMI 35.48 kg/m²  Estimated body mass index is 35.48 kg/m² as calculated from the following:    Height as of this encounter: 6' (1.829 m).    Weight as of this encounter: 261 lb 9.6 oz (118.7 kg).  Physical Exam  Vitals and nursing note reviewed.   Constitutional:       Appearance: Normal appearance. He is normal weight.   HENT:      Head: Normocephalic.      Right Ear: Tympanic membrane, ear canal and external ear normal. There is no impacted cerumen.      Left Ear: Tympanic membrane, ear  canal and external ear normal. There is no impacted cerumen.      Nose: Nose normal. No congestion or rhinorrhea.      Mouth/Throat:      Mouth: Mucous membranes are moist.      Pharynx: No oropharyngeal exudate or posterior oropharyngeal erythema.      Comments: Narrow oropharynx  Eyes:      Extraocular Movements: Extraocular movements intact.      Pupils: Pupils are equal, round, and reactive to light.   Cardiovascular:      Rate and Rhythm: Normal rate and regular rhythm.      Pulses: Normal pulses.      Heart sounds: Normal heart sounds. No murmur heard.  Pulmonary:      Effort: Pulmonary effort is normal. No respiratory distress.      Breath sounds: Normal breath sounds. No wheezing.   Abdominal:      General: There is no distension.      Palpations: Abdomen is soft.      Tenderness: There is no abdominal tenderness.   Musculoskeletal:         General: Normal range of motion.      Cervical back: Normal range of motion and neck supple.      Right lower leg: No edema.      Left lower leg: No edema.   Lymphadenopathy:      Cervical: No cervical adenopathy.   Skin:     General: Skin is warm and dry.      Capillary Refill: Capillary refill takes less than 2 seconds.      Findings: No rash.      Comments: Large amount of varying sized skin tags. Scatted all over body worst under arms.    Neurological:      General: No focal deficit present.      Mental Status: He is alert and oriented to person, place, and time.   Psychiatric:         Mood and Affect: Mood normal.         Behavior: Behavior normal.           Assessment & Plan:   1. Encounter for wellness examination in adult (Primary)  -     Hemoglobin A1C; Future; Expected date: 07/22/2024  -     CBC With Differential With Platelet; Future; Expected date: 07/22/2024  -     Comp Metabolic Panel (14); Future; Expected date: 07/22/2024  -     Lipid Panel; Future; Expected date: 07/22/2024  -     TSH W Reflex To Free T4; Future; Expected date: 07/22/2024  -      Testosterone,Total and Weakly Bound w/ SHBG; Future; Expected date: 07/22/2024  -     Vitamin D; Future; Expected date: 07/22/2024  2. Obstructive sleep apnea syndrome  -     Home Sleep Apnea Test (Adult pt only) - Sleep consult required for Medicare pts  -     General sleep study; Future; Expected date: 08/22/2024  3. Colon cancer screening  -     Gastro Referral - In Network  4. Low testosterone in male  -     Testosterone,Total and Weakly Bound w/ SHBG; Future; Expected date: 07/22/2024  -     Endocrine Referral - In Network  -     Vitamin D; Future; Expected date: 07/22/2024  5. Skin tags, multiple acquired  -     Derm Referral - In Network  6. Blood in stool    1. Encounter for wellness examination in adult  Wellness labs ordered.  Encouraged increasing physical activity which includes raising heart rate 3 to 5 days/week for at least 30 minutes at a time, while also performing mild strength exercises.  Patient counseled on importance of dietary modifications, which may include limiting fats, red meat, and carbohydrates, while increasing fruit and vegetable intake, and trying to adhere to a low sodium/Mediterranean diet.  Encouraged to manage stress.  Encouraged good sleep habits.  Encouraged good sexual health.    GI referral provided for colon cancer screen.    Patient aware of plan of care. All questions answered to satisfaction of the patient. Patient instructed to call office or reach out via [facility] if any issues arise. For urgent issues and/or reviewed red flags please proceed to the urgent care or ER.  Also, inform the nurse practitioner with any new symptoms or medication side effects.    - Hemoglobin A1C; Future  - CBC With Differential With Platelet; Future  - Comp Metabolic Panel (14); Future  - Lipid Panel; Future  - TSH W Reflex To Free T4; Future  - Testosterone,Total and Weakly Bound w/ SHBG; Future  - Vitamin D; Future    2. Obstructive sleep apnea syndrome  Has hx of YULIYA  Needs new exam and new  order for cpap  Snoring, apnea, and daytime fatigue  Requesting home study.  - Home Sleep Apnea Test (Adult pt only) - Sleep consult required for Medicare pts  - General sleep study; Future    3. Colon cancer screening  Referral to GI  - Gastro Referral - In Network    4. Low testosterone in male  Check testosterone levels in am  Referral to endocrine.  Educated on anabolics and SE  - Testosterone,Total and Weakly Bound w/ SHBG; Future  - Endocrine Referral - In Network  - Vitamin D; Future    5. Skin tags, multiple acquired  Would like to discuss removal with derm  Referral placed.  - Derm Referral - In Network    6. Blood in stool  Poor diet with minimal fiber.  Educated on increased fiber and water.  GI referral for c scope      Return if symptoms worsen or fail to improve.    Bryan Bray, APRN, 7/21/2024, 5:43 PM

## 2024-07-22 ENCOUNTER — OFFICE VISIT (OUTPATIENT)
Dept: FAMILY MEDICINE CLINIC | Facility: CLINIC | Age: 49
End: 2024-07-22
Payer: COMMERCIAL

## 2024-07-22 VITALS
HEIGHT: 72 IN | HEART RATE: 69 BPM | WEIGHT: 261.63 LBS | TEMPERATURE: 97 F | DIASTOLIC BLOOD PRESSURE: 88 MMHG | OXYGEN SATURATION: 98 % | BODY MASS INDEX: 35.44 KG/M2 | SYSTOLIC BLOOD PRESSURE: 138 MMHG

## 2024-07-22 DIAGNOSIS — K92.1 BLOOD IN STOOL: ICD-10-CM

## 2024-07-22 DIAGNOSIS — L91.8 SKIN TAGS, MULTIPLE ACQUIRED: ICD-10-CM

## 2024-07-22 DIAGNOSIS — Z12.11 COLON CANCER SCREENING: ICD-10-CM

## 2024-07-22 DIAGNOSIS — R79.89 LOW TESTOSTERONE IN MALE: ICD-10-CM

## 2024-07-22 DIAGNOSIS — Z00.00 ENCOUNTER FOR WELLNESS EXAMINATION IN ADULT: Primary | ICD-10-CM

## 2024-07-22 DIAGNOSIS — G47.33 OBSTRUCTIVE SLEEP APNEA SYNDROME: ICD-10-CM

## 2024-07-22 RX ORDER — ALLOPURINOL 300 MG/1
300 TABLET ORAL DAILY
COMMUNITY

## 2024-07-22 RX ORDER — AMLODIPINE BESYLATE 2.5 MG/1
2.5 TABLET ORAL DAILY
COMMUNITY

## 2024-07-22 RX ORDER — COLCHICINE 0.6 MG/1
0.6 TABLET ORAL DAILY
COMMUNITY

## 2024-07-27 ENCOUNTER — LAB ENCOUNTER (OUTPATIENT)
Dept: LAB | Age: 49
End: 2024-07-27
Payer: COMMERCIAL

## 2024-07-27 DIAGNOSIS — Z00.00 ENCOUNTER FOR WELLNESS EXAMINATION IN ADULT: ICD-10-CM

## 2024-07-27 DIAGNOSIS — R79.89 LOW TESTOSTERONE IN MALE: ICD-10-CM

## 2024-07-27 LAB
ALBUMIN SERPL-MCNC: 4.6 G/DL (ref 3.2–4.8)
ALBUMIN/GLOB SERPL: 1.6 {RATIO} (ref 1–2)
ALP LIVER SERPL-CCNC: 79 U/L
ALT SERPL-CCNC: 75 U/L
ANION GAP SERPL CALC-SCNC: 7 MMOL/L (ref 0–18)
AST SERPL-CCNC: 31 U/L (ref ?–34)
BASOPHILS # BLD AUTO: 0.08 X10(3) UL (ref 0–0.2)
BASOPHILS NFR BLD AUTO: 1.3 %
BILIRUB SERPL-MCNC: 0.4 MG/DL (ref 0.3–1.2)
BUN BLD-MCNC: 19 MG/DL (ref 9–23)
BUN/CREAT SERPL: 17.6 (ref 10–20)
CALCIUM BLD-MCNC: 9.3 MG/DL (ref 8.7–10.4)
CHLORIDE SERPL-SCNC: 106 MMOL/L (ref 98–112)
CHOLEST SERPL-MCNC: 206 MG/DL (ref ?–200)
CO2 SERPL-SCNC: 27 MMOL/L (ref 21–32)
CREAT BLD-MCNC: 1.08 MG/DL
DEPRECATED RDW RBC AUTO: 38.4 FL (ref 35.1–46.3)
EGFRCR SERPLBLD CKD-EPI 2021: 84 ML/MIN/1.73M2 (ref 60–?)
EOSINOPHIL # BLD AUTO: 0.24 X10(3) UL (ref 0–0.7)
EOSINOPHIL NFR BLD AUTO: 3.9 %
ERYTHROCYTE [DISTWIDTH] IN BLOOD BY AUTOMATED COUNT: 12.7 % (ref 11–15)
EST. AVERAGE GLUCOSE BLD GHB EST-MCNC: 174 MG/DL (ref 68–126)
FASTING PATIENT LIPID ANSWER: YES
FASTING STATUS PATIENT QL REPORTED: YES
GLOBULIN PLAS-MCNC: 2.9 G/DL (ref 2–3.5)
GLUCOSE BLD-MCNC: 188 MG/DL (ref 70–99)
HBA1C MFR BLD: 7.7 % (ref ?–5.7)
HCT VFR BLD AUTO: 45.2 %
HDLC SERPL-MCNC: 40 MG/DL (ref 40–59)
HGB BLD-MCNC: 15.7 G/DL
IMM GRANULOCYTES # BLD AUTO: 0.01 X10(3) UL (ref 0–1)
IMM GRANULOCYTES NFR BLD: 0.2 %
LDLC SERPL CALC-MCNC: 145 MG/DL (ref ?–100)
LYMPHOCYTES # BLD AUTO: 2.24 X10(3) UL (ref 1–4)
LYMPHOCYTES NFR BLD AUTO: 36.8 %
MCH RBC QN AUTO: 29 PG (ref 26–34)
MCHC RBC AUTO-ENTMCNC: 34.7 G/DL (ref 31–37)
MCV RBC AUTO: 83.5 FL
MONOCYTES # BLD AUTO: 0.51 X10(3) UL (ref 0.1–1)
MONOCYTES NFR BLD AUTO: 8.4 %
NEUTROPHILS # BLD AUTO: 3.01 X10 (3) UL (ref 1.5–7.7)
NEUTROPHILS # BLD AUTO: 3.01 X10(3) UL (ref 1.5–7.7)
NEUTROPHILS NFR BLD AUTO: 49.4 %
NONHDLC SERPL-MCNC: 166 MG/DL (ref ?–130)
OSMOLALITY SERPL CALC.SUM OF ELEC: 297 MOSM/KG (ref 275–295)
PLATELET # BLD AUTO: 244 10(3)UL (ref 150–450)
POTASSIUM SERPL-SCNC: 3.9 MMOL/L (ref 3.5–5.1)
PROT SERPL-MCNC: 7.5 G/DL (ref 5.7–8.2)
RBC # BLD AUTO: 5.41 X10(6)UL
SODIUM SERPL-SCNC: 140 MMOL/L (ref 136–145)
TRIGL SERPL-MCNC: 117 MG/DL (ref 30–149)
TSI SER-ACNC: 1.16 MIU/ML (ref 0.55–4.78)
VIT D+METAB SERPL-MCNC: 15.9 NG/ML (ref 30–100)
VLDLC SERPL CALC-MCNC: 22 MG/DL (ref 0–30)
WBC # BLD AUTO: 6.1 X10(3) UL (ref 4–11)

## 2024-07-27 PROCEDURE — 83036 HEMOGLOBIN GLYCOSYLATED A1C: CPT

## 2024-07-27 PROCEDURE — 80053 COMPREHEN METABOLIC PANEL: CPT

## 2024-07-27 PROCEDURE — 80061 LIPID PANEL: CPT

## 2024-07-27 PROCEDURE — 85025 COMPLETE CBC W/AUTO DIFF WBC: CPT

## 2024-07-27 PROCEDURE — 84443 ASSAY THYROID STIM HORMONE: CPT

## 2024-07-27 PROCEDURE — 36415 COLL VENOUS BLD VENIPUNCTURE: CPT

## 2024-07-27 PROCEDURE — 84410 TESTOSTERONE BIOAVAILABLE: CPT

## 2024-07-27 PROCEDURE — 82306 VITAMIN D 25 HYDROXY: CPT

## 2024-07-29 NOTE — PROGRESS NOTES
Left a detailed message to cell ( Ok per release of information ) regarding below message, advised to call back to schedule a 40min follow-up appt to discuss his lab results

## 2024-08-01 DIAGNOSIS — E29.1 HYPOTESTOSTERONEMIA IN MALE: Primary | ICD-10-CM

## 2024-08-01 LAB
SEX HORM BIND GLOB: 22.5 NMOL/L
TESTOST % FREE+WEAK BND: 30 %
TESTOST FREE+WEAK BND: 67.4 NG/DL
TESTOSTERONE TOT /MS: 224.6 NG/DL

## 2024-08-01 NOTE — PROGRESS NOTES
1. Hypotestosteronemia in male  Total testosterone on am draw 224.6.  Hypotestosterone  Referral to [facility] for repeat draw and TRT.  - Endocrine Referral - In Network    MARSHA Douglas

## 2024-09-03 ENCOUNTER — OFFICE VISIT (OUTPATIENT)
Dept: SLEEP CENTER | Age: 49
End: 2024-09-03
Payer: COMMERCIAL

## 2024-09-03 DIAGNOSIS — G47.33 OBSTRUCTIVE SLEEP APNEA SYNDROME: ICD-10-CM

## 2024-09-03 PROCEDURE — 95806 SLEEP STUDY UNATT&RESP EFFT: CPT

## 2024-09-05 NOTE — PROCEDURES
[facility]  Accredited by the American Academy of Sleep Medicine (AASM)    PATIENT'S NAME: LOLA LOPEZ   ATTENDING PHYSICIAN: CLIFF Bray   REFERRING PHYSICIAN: CLIFF Bray   PATIENT ACCOUNT #: [id number] LOCATION: Sleep Center   MEDICAL RECORD #: [id number] YOB: 1975   DATE OF STUDY: 09/03/2024       SLEEP STUDY REPORT    STUDY TYPE:  Home sleep test.    ORDERING PROVIDER:  MERYL Douglas    INDICATION:  Suspected obstructive sleep apnea (ICD-10 code G47.33) in patient with snoring, witnessed apneic events, daytime somnolence, body mass index 35.7, hypertension, migraines, prior history of CPAP usage, an Mary D Sleepiness Scale score of 12/24.    RESULTS:  The patient underwent home sleep test with measurement of his nasal air flow, nasal air pressure, snoring, chest and abdominal wall motion, oximetry, and body position.  I have reviewed the entirety of the raw data of this study.  During this study, total recording time is 471 minutes.  The lights-out clock time is 10:11 p.m., the lights-on clock time is 6:02 a.m.  The apnea plus hypopnea index is 30.4 events per hour, rising to 37.1 per hour in the supine position.  The average oxygen saturation is 94% the lowest oxygen saturation is 87%, and the patient spent 0.9% of the test with saturations 88% or less.  The average heart rate is 62 beats per minute, and the patient spent approximately 70% of the test in the supine position.    INTERPRETATION:  The data generated from this study is consistent with severe obstructive sleep apnea (ICD-10 code G47.33).    RECOMMENDATIONS:    1.   CPAP titration.  2.   Weight loss.  3.   Avoid alcohol.  4.   Avoid sedating drug.  5.   Patient should not drive if at all sleepy.    Please do not hesitate to contact me if there is any question whatsoever regarding interpretation of this study.    Dictated By Sheldon Ramirez MD  d: 09/05/2024 16:57:28  t: 09/05/2024  18:11:55  [facility] [id number]  formerly [facility]/    cc: MARSHA Douglas.

## 2024-09-06 DIAGNOSIS — G47.33 OBSTRUCTIVE SLEEP APNEA SYNDROME: Primary | ICD-10-CM

## 2024-09-07 NOTE — PROGRESS NOTES
1. Obstructive sleep apnea syndrome  Home sleep study with severe obstructive sleep apnea  Recommendations from pulm:  1.       CPAP titration.  2.       Weight loss.  3.       Avoid alcohol.  4.       Avoid sedating drug.  5.       Patient should not drive if at all sleepy.  Cpap titration ordered, await results for DME  - CPAP TITRATION STUDY ADULT  - General sleep study; Future    MARSHA Douglas

## 2024-09-16 ENCOUNTER — ORDER TRANSCRIPTION (OUTPATIENT)
Dept: SLEEP CENTER | Age: 49
End: 2024-09-16

## 2024-09-30 ENCOUNTER — OFFICE VISIT (OUTPATIENT)
Dept: ENDOCRINOLOGY CLINIC | Facility: CLINIC | Age: 49
End: 2024-09-30
Payer: COMMERCIAL

## 2024-09-30 VITALS
WEIGHT: 260 LBS | HEIGHT: 72 IN | HEART RATE: 66 BPM | DIASTOLIC BLOOD PRESSURE: 96 MMHG | SYSTOLIC BLOOD PRESSURE: 144 MMHG | BODY MASS INDEX: 35.21 KG/M2

## 2024-09-30 DIAGNOSIS — E29.1 HYPOGONADISM IN MALE: Primary | ICD-10-CM

## 2024-09-30 DIAGNOSIS — E66.9 CLASS 2 OBESITY WITH BODY MASS INDEX (BMI) OF 35.0 TO 35.9 IN ADULT, UNSPECIFIED OBESITY TYPE, UNSPECIFIED WHETHER SERIOUS COMORBIDITY PRESENT: ICD-10-CM

## 2024-09-30 DIAGNOSIS — E78.2 MIXED HYPERLIPIDEMIA: ICD-10-CM

## 2024-09-30 DIAGNOSIS — I10 ESSENTIAL HYPERTENSION: ICD-10-CM

## 2024-09-30 DIAGNOSIS — R73.09 HIGH GLUCOSE LEVEL: ICD-10-CM

## 2024-09-30 DIAGNOSIS — E11.65 UNCONTROLLED TYPE 2 DIABETES MELLITUS WITH HYPERGLYCEMIA (HCC): ICD-10-CM

## 2024-09-30 DIAGNOSIS — G47.30 SLEEP APNEA, UNSPECIFIED TYPE: ICD-10-CM

## 2024-09-30 DIAGNOSIS — E55.9 VITAMIN D DEFICIENCY: ICD-10-CM

## 2024-09-30 DIAGNOSIS — E29.1 TESTICULAR HYPOFUNCTION: ICD-10-CM

## 2024-09-30 DIAGNOSIS — R06.83 SNORING: ICD-10-CM

## 2024-09-30 DIAGNOSIS — G47.33 OBSTRUCTIVE SLEEP APNEA SYNDROME: ICD-10-CM

## 2024-09-30 DIAGNOSIS — R73.9 HYPERGLYCEMIA: ICD-10-CM

## 2024-09-30 PROCEDURE — 99205 OFFICE O/P NEW HI 60 MIN: CPT | Performed by: INTERNAL MEDICINE

## 2024-09-30 RX ORDER — ERGOCALCIFEROL 1.25 MG/1
50000 CAPSULE ORAL WEEKLY
Qty: 12 CAPSULE | Refills: 0 | Status: SHIPPED | OUTPATIENT
Start: 2024-09-30 | End: 2024-12-17

## 2024-09-30 RX ORDER — ATORVASTATIN CALCIUM 20 MG/1
20 TABLET, FILM COATED ORAL NIGHTLY
Qty: 90 TABLET | Refills: 0 | Status: SHIPPED | OUTPATIENT
Start: 2024-09-30 | End: 2024-12-29

## 2024-09-30 RX ORDER — METFORMIN HCL 500 MG
500 TABLET, EXTENDED RELEASE 24 HR ORAL 2 TIMES DAILY WITH MEALS
Qty: 180 TABLET | Refills: 1 | Status: SHIPPED | OUTPATIENT
Start: 2024-09-30

## 2024-09-30 NOTE — PROGRESS NOTES
New Patient Evaluation - History and Physical    CONSULT - Reason for Visit:    low testosterone, DM, DLP, YULIYA, HTN, low vit D   Requesting Physician: Alek Sousa MD   ..No primary care provider on file.    CHIEF COMPLAINT:    Chief Complaint   Patient presents with    Consult     testosterone        HISTORY OF PRESENT ILLNESS:   Yehuda Graves is a 49 year old male who presents with    low testosterone, DM, DLP, YULIYA, HTN, low vit D  and obesity     Labs in 2011 showed low testosterone levels. Was on tx for  ~ 2 yrs.   Took testosterone once a month inj then gel   Stopped tx in 2013.   He c/o lethargy, low libido, , lack of motivation and mood changes   Has migraines  YULIYA , had test and will get more testing . Used to have a CPAP machine.    The patient endorses the bolded symptoms: Decreased ability to play sports, falling asleep after dinner, change in mood, sad, does not enjoy life or the same activities as before, change in body hair and facial hair, hot flashes, heat/cold intolerance, ,skin tags, excessive weight gain, Height loss, Hands/shoe/hat size, gynecomastia, galactorrhea, Headache, Change in vision,     Puberty: unremarkable   He has 3 biological children at age  23-25-27 . He did not need to seek fertility assistance/treatment.   Steroid: No   Anabolic steroids: No  Previous testosterone or \"supplements\" : yes [id number]  Head trauma: No  Infection (mumps) or trauma to the testicles: No     HTN on and CCB and ACEi   DM not on meds   DLP not on meds       Lab Results   Component Value Date    A1C 7.7 (H) 07/27/2024    A1C 6.9 (H) 02/19/2021    A1C 6.3 (H) 12/28/2019    A1C 6.4 (H) 04/17/2018      Wt Readings from Last 6 Encounters:   09/30/24 260 lb (117.9 kg)   07/22/24 261 lb 9.6 oz (118.7 kg)   02/19/21 273 lb 12.8 oz (124.2 kg)   12/27/19 266 lb (120.7 kg)   03/08/19 259 lb 12.8 oz (117.8 kg)   06/15/18 269 lb 6.4 oz (122.2 kg)           ASSESSMENT AND PLAN:  Yehuda Graves is a 49 year old male  who presents with  low testosterone, DM, DLP, YULIYA, HTN, low vit D  and obesity     We discussed his result   Free testosterone is normal   Pt will work on lifestyle changes and controlling other risk factors - wt gain, YULIYA, DM then reassess testo levels     Plan  Repeat BP before discharge still high - check BP at home and follow up with PCP if needed increase lisinopril to 40 mg/day      Fasting AM labs 8-9 AM and repeat them in 3 mo before next visit     Target is wt loss 5-10% of body wt     Metformin start with 500 mg daily for a week, increase to 500 mg twice a day for a week, then 1000 mg at night and 500 mg in the morning for a week, then increase to 1000 mg twice day. If getting diarrhea, wait and do not increase the dose till the diarrhea resolves.     Atorvastatin     Check BP at home     Treat YULIYA     Take Ergocalciferol 04640 international unit a week for 12 weeks, then take OTC vitamin D3 7557-9542 international unit daily.      Will refer to CDE           PAST MEDICAL HISTORY:   Past Medical History:    Heart murmur   DM, HTN, DLP, gout, YULIYA, migraine    PAST SURGICAL HISTORY:   History reviewed. No pertinent surgical history.  no  CURRENT MEDICATIONS:     metFORMIN  MG Oral Tablet 24 Hr Take 1 tablet (500 mg total) by mouth 2 (two) times daily with meals. 180 tablet 1    atorvastatin 20 MG Oral Tab Take 1 tablet (20 mg total) by mouth nightly. 90 tablet 0    ergocalciferol 1.25 MG (62303 UT) Oral Cap Take 1 capsule (50,000 Units total) by mouth once a week for 12 doses. 12 capsule 0    amLODIPine 2.5 MG Oral Tab Take 1 tablet (2.5 mg total) by mouth daily.      colchicine 0.6 MG Oral Tab Take 1 tablet (0.6 mg total) by mouth daily.      allopurinol 300 MG Oral Tab Take 1 tablet (300 mg total) by mouth daily.      lisinopril 20 MG Oral Tab TAKE 1 TABLET BY MOUTH DAILY. FOR HIGH BLOOD PRESSURE 90 tablet 1       ALLERGIES:  No Known Allergies    SOCIAL HISTORY:    Social History     Socioeconomic  History    Marital status:    Tobacco Use    Smoking status: Never     Passive exposure: Never    Smokeless tobacco: Never   Vaping Use    Vaping status: Never Used   Substance and Sexual Activity    Alcohol use: Yes     Comment: rarely    Drug use: No   Other Topics Concern    Caffeine Concern No     Works in [employer]     Smoking no  Marijuana no  Etoh occ  Drugs no  FAMILY HISTORY:   History reviewed. No pertinent family history.   DM in cousins and aunts   Cousin with t1DM   Father had lung and liver ca    REVIEW OF SYSTEMS:  All negative other than HPI    PHYSICAL EXAM:   Height: 6' (182.9 cm) (09/30 0930)  Weight: 260 lb (117.9 kg) (09/30 0930)  BSA (Calculated - sq m): 2.38 sq meters (09/30 0930)  Pulse: 66 (09/30 0930)  BP: 144/96 (09/30 1005)  Temp: --  Do Not Use - Resp Rate: --  SpO2: --    Body mass index is 35.26 kg/m².    CONSTITUTIONAL:  Awake and alert. Age appropriate, good hygiene not in acute distress. Well-nourished and well developed. no acute distress   PSYCH:   Orientated to time, place, person & situation, Normal mood and affect, memory intact, normal insight and judgment, cooperative  Neuro: speech is clear. Awake, alert, no aphasia, no facial asymmetry, no nuchal rigidity  EYES:  No proptosis, no ptosis, conjunctiva normal  ENT:  Normocephalic, atraumatic  Eye: EOMI, normal lids, no discharge, no conjunctival erythema. No exophthalmos/proptosis, Ptosis negative   No rhinorrhea, moist oral mucosa  Neck: full range of motion  Neck/Thyroid: neck inspection: normal, No scar, No goiter   LUNGS:  No acute respiratory distress, non-labored respiration. Speaking full sentences  CARDIOVASCULAR:  regular rate   ABDOMEN:  No abdominal pain.   SKIN:  no bruising or bleeding, no rashes and no lesions, Skin is dry, no obvious rashes or lesions  EXTREMITIES: no gross abnormality   MSK: Moves extremities spontaneously. full range of motion in all major joints      DATA:     Pertinent data reviewed       Latest Reference Range & Units 07/27/24 08:39   Sex Horm Bind Glob 16.5 - 55.9 nmol/L 22.5   Testost % Free+Weak Bnd 9.0 - 46.0 % 30.0   Testost Free+Weak Bnd 40.0 - 250.0 ng/dL 67.4   Testosterone Tot LC/.0 - 916.0 ng/dL 224.6 (L)   (L): Data is abnormally low   Latest Reference Range & Units 06/25/11 10:05   Prolactin 1.4 - 24.2 ng/mL 5.5   FSH 1.3 - 19.3 miu/mL 3.6   Testosterone Total 240 - 950 ng/dL 184 (L)   (L): Data is abnormally low   Latest Reference Range & Units 06/25/11 10:05   TESTO (tfs), FREE 9 - 30 ng/dL 7.7 (L)   (L): Data is abnormally low   Latest Reference Range & Units 07/27/24 08:39   TSH 0.550 - 4.780 mIU/mL 1.156      Latest Reference Range & Units 07/27/24 08:39   AST (SGOT) <34 U/L 31   ALT (SGPT) 10 - 49 U/L 75 (H)   (H): Data is abnormally high   Latest Reference Range & Units 07/27/24 08:39   VITAMIN D, 25-OH, TOTAL 30.0 - 100.0 ng/mL 15.9 (L)   (L): Data is abnormally low   Latest Reference Range & Units 08/09/21 08:56 07/27/24 08:39   Glucose 70 - 99 mg/dL  188 (H)   HEMOGLOBIN A1c <5.7 %  7.7 (H)   External HGBA1C <=5.6 % 6.2 (H) (E)    ESTIMATED AVERAGE GLUCOSE 68 - 126 mg/dL  174 (H)   Sodium 136 - 145 mmol/L  140   (H): Data is abnormally high  (E): External lab result     No results for input(s): \"TSH\", \"T4F\", \"T3F\", \"THYP\" in the last 72 hours.  No results found.    Orders Placed This Encounter   Procedures    TSH and Free T4    Prolactin    Testosterone,Total and Weakly Bound w/ SHBG    FSH    Estradiol    IGF-1    LH (Luteinizing Hormone)    Testosterone,Total and Weakly Bound w/ SHBG    Comp Metabolic Panel [E]    Lipid Panel     Orders Placed This Encounter    TSH and Free T4     Order Specific Question:   Release to patient     Answer:   Immediate    Prolactin     Order Specific Question:   Release to patient     Answer:   Immediate    Testosterone,Total and Weakly Bound w/ SHBG     Order Specific Question:   Release to patient     Answer:   Immediate    FSH      Standing Status:   Future     Standing Expiration Date:   9/30/2025     Order Specific Question:   Release to patient     Answer:   Immediate    Estradiol     Standing Status:   Future     Standing Expiration Date:   9/30/2025     Order Specific Question:   Release to patient     Answer:   Immediate    IGF-1     Order Specific Question:   Release to patient     Answer:   Immediate    LH (Luteinizing Hormone)     Standing Status:   Future     Standing Expiration Date:   9/30/2025     Order Specific Question:   Release to patient     Answer:   Immediate    Testosterone,Total and Weakly Bound w/ SHBG     Standing Status:   Future     Standing Expiration Date:   9/30/2025     Order Specific Question:   Release to patient     Answer:   Immediate    Comp Metabolic Panel [E]     Standing Status:   Future     Standing Expiration Date:   9/30/2025     Order Specific Question:   Release to patient     Answer:   Immediate    Lipid Panel     Standing Status:   Future     Standing Expiration Date:   9/30/2025     Order Specific Question:   Release to patient     Answer:   Immediate    metFORMIN  MG Oral Tablet 24 Hr     Sig: Take 1 tablet (500 mg total) by mouth 2 (two) times daily with meals.     Dispense:  180 tablet     Refill:  1    atorvastatin 20 MG Oral Tab     Sig: Take 1 tablet (20 mg total) by mouth nightly.     Dispense:  90 tablet     Refill:  0    ergocalciferol 1.25 MG (01309 UT) Oral Cap     Sig: Take 1 capsule (50,000 Units total) by mouth once a week for 12 doses.     Dispense:  12 capsule     Refill:  0          This is a specialized patient consultation in endocrinology and required comprehensive review of prior records, as well as current evaluation, with time required for consideration of complex endocrine issues and consultation. For this visit, I personally interviewed the patient, and family member if accompanied, performed the pertinent parts of the history and physical examination. ROS included  screening for appropriate endocrine conditions.   Today's diagnosis and plan were reviewed in detail with the patient who states understanding and agrees with plan. I discussed with the patient possible diagnosis, differential diagnosis, need for work up, treatment options, alternatives and side effects.     Please see note for details about time spent which includes:   · pre-visit preparation  · reviewing records  · face to face time with the patient   · timely documentation of the encounter  · ordering medications/tests  · communication with care team  · care coordination    I appreciate the opportunity to be part of your patient's medical care and will keep you, as the referring and primary physicians, informed about the care of your patient. Please feel free to contact me should you have any questions.    The 21st Century Cures Act makes medical notes like these available to patients in the interest of transparency. Please be advised this is a medical document. Medical documents are intended to carry relevant information, facts as evident, and the clinical opinion of the practitioner. The medical note is intended as peer to peer communication and may appear blunt or direct. It is written in medical language and may contain abbreviations or verbiage that are unfamiliar.   Nicolette Cervantes MD

## 2024-09-30 NOTE — PATIENT INSTRUCTIONS
Repeat BP before discharge still high - check BP at home and follow up with PCP if needed increase lisinopril to 40 mg/day     Fasting AM labs 8-9 AM and repeat them in 3 mo before next visit     Target is wt loss 5-10% of body wt     Metformin start with 500 mg daily for a week, increase to 500 mg twice a day for a week, then 1000 mg at night and 500 mg in the morning for a week, then increase to 1000 mg twice day. If getting diarrhea, wait and do not increase the dose till the diarrhea resolves.     Atorvastatin     Check BP at home     Treat YULIYA     Take Ergocalciferol 85096 international unit a week for 12 weeks, then take OTC vitamin D3 0525-4210 international unit daily.      Will refer to CDE

## 2024-10-01 ENCOUNTER — TELEPHONE (OUTPATIENT)
Facility: CLINIC | Age: 49
End: 2024-10-01

## 2024-10-01 ENCOUNTER — OFFICE VISIT (OUTPATIENT)
Facility: CLINIC | Age: 49
End: 2024-10-01
Payer: COMMERCIAL

## 2024-10-01 ENCOUNTER — OFFICE VISIT (OUTPATIENT)
Dept: DERMATOLOGY CLINIC | Facility: CLINIC | Age: 49
End: 2024-10-01
Payer: COMMERCIAL

## 2024-10-01 VITALS
BODY MASS INDEX: 35.17 KG/M2 | HEART RATE: 67 BPM | SYSTOLIC BLOOD PRESSURE: 144 MMHG | WEIGHT: 259.69 LBS | DIASTOLIC BLOOD PRESSURE: 91 MMHG | HEIGHT: 72 IN

## 2024-10-01 DIAGNOSIS — L91.8 SKIN TAG: Primary | ICD-10-CM

## 2024-10-01 DIAGNOSIS — K62.5 BRBPR (BRIGHT RED BLOOD PER RECTUM): ICD-10-CM

## 2024-10-01 DIAGNOSIS — R74.01 ELEVATED ALT MEASUREMENT: ICD-10-CM

## 2024-10-01 DIAGNOSIS — Z12.11 COLON CANCER SCREENING: Primary | ICD-10-CM

## 2024-10-01 DIAGNOSIS — R19.5 LOOSE STOOLS: ICD-10-CM

## 2024-10-01 PROCEDURE — 99204 OFFICE O/P NEW MOD 45 MIN: CPT

## 2024-10-01 PROCEDURE — 11200 RMVL SKIN TAGS UP TO&INC 15: CPT | Performed by: PHYSICIAN ASSISTANT

## 2024-10-01 PROCEDURE — 99203 OFFICE O/P NEW LOW 30 MIN: CPT | Performed by: PHYSICIAN ASSISTANT

## 2024-10-01 RX ORDER — LISINOPRIL 20 MG/1
20 TABLET ORAL DAILY
Qty: 60 TABLET | Refills: 0 | Status: SHIPPED | OUTPATIENT
Start: 2024-10-01 | End: 2024-11-30

## 2024-10-01 NOTE — PROGRESS NOTES
HPI:    Patient ID: Yehuda Graves is a 49 year old male.    Patient presents with referral for multiple acquired skin tags. Presents for skin growths covering his body. Denies itching  or pain. Patient with raised lesion on the left eyebrow for 10-15 years. Denies pain. Denies personal or family Hx of skin cancer. No draining or tenderness noted. No allergies to medications noted.         Review of Systems   Constitutional:  Negative for chills and fever.   Musculoskeletal:  Negative for arthralgias and myalgias.   Skin:  Positive for rash. Negative for color change and wound.            Current Outpatient Medications   Medication Sig Dispense Refill    lisinopril 20 MG Oral Tab Take 1 tablet (20 mg total) by mouth daily. FOR HIGH BLOOD PRESSURE 60 tablet 0    polyethylene glycol, PEG 3350-KCl-NaBcb-NaCl-NaSulf, 236 g Oral Recon Soln Take 4,000 mL by mouth As Directed. Take 2,000 mL the night before your procedure and 2,000 mL the morning of your procedure. Take as directed by GI clinic. Okay to substitute for generic. 1 each 0    metFORMIN  MG Oral Tablet 24 Hr Take 1 tablet (500 mg total) by mouth 2 (two) times daily with meals. 180 tablet 1    atorvastatin 20 MG Oral Tab Take 1 tablet (20 mg total) by mouth nightly. 90 tablet 0    ergocalciferol 1.25 MG (51311 UT) Oral Cap Take 1 capsule (50,000 Units total) by mouth once a week for 12 doses. 12 capsule 0    amLODIPine 2.5 MG Oral Tab Take 1 tablet (2.5 mg total) by mouth daily.      colchicine 0.6 MG Oral Tab Take 1 tablet (0.6 mg total) by mouth daily.      allopurinol 300 MG Oral Tab Take 1 tablet (300 mg total) by mouth daily.      Ondansetron HCl (ZOFRAN) 8 MG tablet Take 8 mg by mouth. (Patient not taking: Reported on 7/22/2024)       Allergies:No Known Allergies   There were no vitals taken for this visit.  There is no height or weight on file to calculate BMI.  PHYSICAL EXAM:   Physical Exam  Constitutional:       General: He is not in acute  distress.     Appearance: Normal appearance.   Skin:     General: Skin is warm and dry.      Findings: Rash present.      Comments: Skin tags noted under the axilla bilaterally, on the back. No draining or tenderenss noted. Pedunculated lesions about 2-3 mm in size.    Neurological:      Mental Status: He is alert and oriented to person, place, and time.                ASSESSMENT/PLAN:   1. Skin tag  -After discussion with patient, advised the following:  -Skin tags removed  -See procedure note  -Care instructions given  -To call or follow-up with worsening symptoms or concerns  -Pt was agreeable to plan and will comply with discussion above.       No orders of the defined types were placed in this encounter.      Meds This Visit:  Requested Prescriptions      No prescriptions requested or ordered in this encounter       Imaging & Referrals:  None         ID#[id number]

## 2024-10-01 NOTE — PROCEDURES
Procedure:  Skin tag removal  With the patient is a supine position, the skin was scrubbed with alcohol.  Anesthesia was obtained by injecting 2 mL of 1% Xylocaine with Epinephrine around the back and under both arms bilaterally. Scissors and forceps were used to remove the skin tags. Skin tags were not sent for histopathology. Hemostasis achieved with cautery and/or aluminum chloride.     The estimated blood loss was < 1mL.     Clinical Dx & Size of lesion(s):    Skin tags were about 2-3 mm in size. Total of 20 skin tags were removed.      Yehuda tolerated the procedure well.  Complications:  none

## 2024-10-01 NOTE — TELEPHONE ENCOUNTER
Please review; protocol failed/No Protocol    Routing High Priority- patient out of medication    Requested Prescriptions   Pending Prescriptions Disp Refills    lisinopril 20 MG Oral Tab 90 tablet 1     Sig: TAKE 1 TABLET BY MOUTH DAILY. FOR HIGH BLOOD PRESSURE       Hypertension Medications Protocol Failed - 10/1/2024 11:34 AM        Failed - Last BP reading less than 140/90     BP Readings from Last 1 Encounters:   10/01/24 (!) 144/91               Passed - CMP or BMP in past 12 months        Passed - In person appointment or virtual visit in the past 12 mos or appointment in next 3 mos     Recent Outpatient Visits              Today Colon cancer screening    [facility] Julia Cano APRN    Office Visit    Yesterday Hypogonadism in male    [facility], [facility], [place] Nicolette Cervantes MD    Office Visit    4 weeks ago Obstructive sleep apnea syndrome    [facility]    Office Visit    2 months ago Encounter for wellness examination in adult    [facility], [place] Bryan Bray APRN    Office Visit    3 years ago Nausea    [facility] [place]    Office Visit          Future Appointments         Provider Department Appt Notes    Today Yadiel Pop PA-C [facility], [address], [place] skin tags ck    In 2 weeks [facility] SLEEP ROOMS [facility] [place] approved 9/13-12/12/24    In 3 months STATHOPOULOS, PROCEDURE [facility] colonoscopy                    Passed - EGFRCR or GFRNAA > 50     GFR Evaluation  EGFRCR: 84 , resulted on 7/27/2024             Future Appointments         Provider Department Appt Notes    Today Yadiel Pop PA-C [facility], [address], [place] skin tags ck    In 2 weeks [facility] SLEEP ROOMS [facility] approved  9/13-12/12/24    In 3 months STATHOPOULOS, PROCEDURE [facility], [facility], [place] colonoscopy          Recent Outpatient Visits              Today Colon cancer screening    [facility], [place] Julia Cano APRN    Office Visit    Yesterday Hypogonadism in male    [facility], [facility], [place] Nicolette Cervantes MD    Office Visit    4 weeks ago Obstructive sleep apnea syndrome    [facility]    Office Visit    2 months ago Encounter for wellness examination in adult    [facility], [facility], Bryan England APRN    Office Visit    3 years ago Nausea    [facility], Deshawn Moore DO    Office Visit

## 2024-10-01 NOTE — H&P
[facility] - Gastroenterology                                                                                                  Clinic History and Physical     Chief Complaint   Patient presents with    Consult     For colon screening       Requesting physician or provider: No primary care provider on file.    HPI:   Yehuda Graves is a 49 year old year-old male with active diagnoses including hypertension, hyperlipidemia, prediabetes, obesity, obstructive sleep apnea. Prior medical/surgical hx in note table. The patient presents for colon cancer screening evaluation.    #heartburn  -reports occasional heartburn, worse at night when he lays down. Usually triggered by alcohol, eating late, red sauce. Takes tums or apple cider vinegar occasionally as needed.   -says his diet is poor, he eats out frequently at restaurants and fast food and has goal to reduce     #BRBPR  -occasional BRBPR mostly on tissue when wiping     #loose stools   -intermittent loose stools the past few months, then stool returns to normal    Patient is here today as a referral from his PCP for evaluation prior to undergoing colonoscopy for CRC screening. Patient denies any GI symptoms of nausea, vomiting, heartburn, dyspepsia, dysphagia, hematemesis, abdominal pain, constipation, or melena.  Additionally there is no unintentional weight loss.    Pt is due for CRC screening. We discussed the available screening options for CRC such as FIT and cologuard. They are electing to pursue colonoscopy at this time.     Last colonoscopy: none   Last EGD: none     NSAIDS: rare   Tobacco: none   Alcohol: at most 6 drinks in a week, usually on weekends   Marijuana: none   Illicit drugs: none     FH GI malignancy:  none     No history of adverse reaction to sedation  YES YULIYA  - doesn't have Cpap yet, he is scheduled for sleep study  No anticoagulants/antiplatelet  No pacemaker/defibrillator  No pain medications and/or sleep aides    Wt Readings from Last 6  Encounters:   10/01/24 259 lb 11.2 oz (117.8 kg)   09/30/24 260 lb (117.9 kg)   07/22/24 261 lb 9.6 oz (118.7 kg)   02/19/21 273 lb 12.8 oz (124.2 kg)   12/27/19 266 lb (120.7 kg)   03/08/19 259 lb 12.8 oz (117.8 kg)          History, Medications, Allergies, ROS:      Past Medical History:    Essential hypertension    Gout    Heart murmur      History reviewed. No pertinent surgical history.   Family Hx:   Family History   Problem Relation Age of Onset    Cancer Father       Social History:   Social History     Socioeconomic History    Marital status:    Tobacco Use    Smoking status: Never     Passive exposure: Never    Smokeless tobacco: Never   Vaping Use    Vaping status: Never Used   Substance and Sexual Activity    Alcohol use: Yes     Alcohol/week: 6.0 standard drinks of alcohol     Types: 6 Cans of beer per week     Comment: rarely    Drug use: Never   Other Topics Concern    Caffeine Concern No        Medications (Active prior to today's visit):  Current Outpatient Medications   Medication Sig Dispense Refill    polyethylene glycol, PEG 3350-KCl-NaBcb-NaCl-NaSulf, 236 g Oral Recon Soln Take 4,000 mL by mouth As Directed. Take 2,000 mL the night before your procedure and 2,000 mL the morning of your procedure. Take as directed by GI clinic. Okay to substitute for generic. 1 each 0    metFORMIN  MG Oral Tablet 24 Hr Take 1 tablet (500 mg total) by mouth 2 (two) times daily with meals. 180 tablet 1    atorvastatin 20 MG Oral Tab Take 1 tablet (20 mg total) by mouth nightly. 90 tablet 0    ergocalciferol 1.25 MG (23713 UT) Oral Cap Take 1 capsule (50,000 Units total) by mouth once a week for 12 doses. 12 capsule 0    amLODIPine 2.5 MG Oral Tab Take 1 tablet (2.5 mg total) by mouth daily.      colchicine 0.6 MG Oral Tab Take 1 tablet (0.6 mg total) by mouth daily.      allopurinol 300 MG Oral Tab Take 1 tablet (300 mg total) by mouth daily.      lisinopril 20 MG Oral Tab TAKE 1 TABLET BY MOUTH  DAILY. FOR HIGH BLOOD PRESSURE 90 tablet 1    Ondansetron HCl (ZOFRAN) 8 MG tablet Take 8 mg by mouth. (Patient not taking: Reported on 7/22/2024)         Allergies:  No Known Allergies    ROS:   CONSTITUTIONAL: negative for fevers, chills, sweats  EYES Negative for scleral icterus or redness, and diplopia  HEENT: Negative for hoarseness  RESPIRATORY: Negative for cough and severe shortness of breath  CARDIOVASCULAR: Negative for crushing sub-sternal chest pain  GASTROINTESTINAL: See HPI  GENITOURINARY: Negative for dysuria  MUSCULOSKELETAL: Negative for arthralgias and myalgias  SKIN: Negative for jaundice, rash or pruritus  NEUROLOGICAL: Negative for dizziness and headaches  BEHAVIOR/PSYCH: Negative for psychotic behavior      PHYSICAL EXAM:   Blood pressure (!) 144/91, pulse 67, height 6' (1.829 m), weight 259 lb 11.2 oz (117.8 kg).    GEN: Alert, no acute distress, well-nourished   HEENT: anicteric sclera, neck supple, trachea midline, MMM, no palpable or tender neck or supraclavicular lymph nodes  CV: RRR, the extremities are warm and well perfused   LUNGS: No increased work of breathing, CTAB  ABDOMEN: Soft, symmetrical, non-tender without distention or guarding. No scars or lesions. Aorta is without bruit or visible pulsation. Umbilicus is midline without herniation. Normoactive bowel sounds are present, No masses, hepatomegaly or splenomegaly noted. Exam limited by body habitus  MSK: No erythema, no warmth, no swelling of joints  SKIN: No jaundice, no erythema, no rashes, no lesions  HEMATOLOGIC: No bleeding, no bruising  NEURO: Alert and interactive, CANO  PSYCH: appropriate mood & affect    Labs/Imaging:     Patient's labs and imaging were reviewed and discussed with patient today.    .  ASSESSMENT/PLAN:   Yehuda Graves is a 49 year old year-old male with active diagnoses including hypertension, hyperlipidemia, prediabetes, obesity, obstructive sleep apnea. Prior medical/surgical hx in note table. The  patient presents for colon cancer screening evaluation.    #heartburn  -reports occasional heartburn, worse at night when he lays down. Usually triggered by alcohol, eating late, red sauce. Takes tums or apple cider vinegar occasionally as needed.   -says his diet is poor, he eats out frequently at restaurants and fast food and has goal to reduce     #BRBPR  -occasional BRBPR mostly on tissue when wiping     #loose stools   -intermittent loose stools the past few months, then stool returns to normal  -advised fiber supplement while he working on diet/health goals    #colorectal cancer screening: patient is considered average risk for colon cancer (No immediate family hx of colon cancer) and it is appropriate to proceed with screening colonoscopy. We discussed risks/benefits/alternatives to procedure, including stool testing, they want to proceed with colonoscopy.  No anemia noted on blood work 7/27/24    #elevated ALT  -elevated ALT. No yearly labs, but intermittent labs since 2008 show consistently elevated ALT. Did normalize in 2019 & 2021, but again elevated in 2024  -has risk factors for MASLD which we discussed today along with lifestyle and treatment of comorbidities. Ordered US elastography to check for fibrosis. Advised yearly follow up.    Recommend:  -can try fiber supplement. Such as psyllium husk fiber, which is the type in metamucil     -follow up as needed or sooner if symptoms worsen     -------------------------------------------------------------------------------------------------    1. Schedule colonoscopy with [facility] Endoscopist  Diagnosis: CRC screen, BRBPR, loose stools   Sedation: MAC   Prep: split dose golytely    2. MEDICATION CHANGES PRIOR TO PROCEDURE    *HOLD metformin day before & day of procedure    -Anti-platelets and anti-coagulants: N/A     Endoscopy risk/benefit discussion: I have thoroughly discussed the risks, benefits, and alternatives of endoscopic evaluation with the  patient, who demonstrated understanding. This includes the potential risks of bleeding, infection, pain, anesthesia complications, and perforation, which may result in prolonged hospitalization or surgical intervention. All of the patient’s questions were addressed to their satisfaction. The patient has chosen to proceed with the endoscopic procedure, including any necessary interventions such as polypectomy, biopsy, control of bleeding.      Orders This Visit:  No orders of the defined types were placed in this encounter.      Meds This Visit:  Requested Prescriptions     Signed Prescriptions Disp Refills    polyethylene glycol, PEG 3350-KCl-NaBcb-NaCl-NaSulf, 236 g Oral Recon Soln 1 each 0     Sig: Take 4,000 mL by mouth As Directed. Take 2,000 mL the night before your procedure and 2,000 mL the morning of your procedure. Take as directed by GI clinic. Okay to substitute for generic.       Imaging & Referrals:  US LIVER WITH ELASTOGRAPHY(CPT=76705,30402)       MARSHA Morin    [facility]  10/1/2024      The dictation was partially prepared using Dragon Medical voice recognition software. As a result, errors may occur. When identified, these errors have been corrected. While every attempt is made to correct errors during dictation, discrepancies may still exist.

## 2024-10-01 NOTE — TELEPHONE ENCOUNTER
Scheduled for:  Colonoscopy 77956  Provider Name:  Dr. Anderson  Date: 1/28/2025   Location:[facility]  Sedation:  MAC  Time: (pt is aware that ENDO  will call the day before to confirm arrival time)    Prep:  Golytely  Meds/Allergies Reconciled?:  MARSHA Morin Reviewed  Diagnosis with codes:  Colon screening Z12.11,bright red blood per rectum K62.5,Loose stool R19.5   Was patient informed to call insurance with codes (Y/N):  Yes  Referral sent?:  Referral was sent at the time of electronic surgical scheduling.  EM or [facility] notified?:  I sent an electronic request to Endo Scheduling and received a confirmation today.  Medication Orders:     *HOLD metformin day before & day of procedure      Pt is aware to NOT take iron pills, herbal meds and diet supplements for 7 days before exam. Also to NOT take any form of alcohol, recreational drugs and any forms of ED meds 24 hours before exam.   Misc Orders:       Further instructions given by staff:  I provide prep instructions to patient at the time of the appointment and reviewed date, time and location, he verbalized that he understood and is aware to call if he has any questions.

## 2024-10-01 NOTE — TELEPHONE ENCOUNTER
Please assist patient in scheduling appt. Needs follow up for blood pressure. Elevated last appt and elevated with endocrine. 60 day supply provided. No further refills until evaluated in office. Thanks

## 2024-10-01 NOTE — PATIENT INSTRUCTIONS
-can try fiber supplement. Such as psyllium husk fiber, which is the type in metamucil     -call to schedule ultrasound liver #[phone]    -follow up yearly or sooner if symptoms worsen     -------------------------------------------------------------------------------------------------    1. Schedule colonoscopy with General Pool Endoscopist  Diagnosis: CRC screen, BRBPR, loose stools   Sedation: MAC   Prep: split dose golytely    2. MEDICATION CHANGES PRIOR TO PROCEDURE    *HOLD metformin day before & day of procedure      3.  bowel prep from pharmacy   You can pick the bowel prep up now and store in a cool, dry place in your home until your scheduled bowel prep start date.    4. Read all bowel prep instructions carefully. Bowel prep instructions can also be found online at:  [email or web address]     5. AVOID seeds, nuts, popcorn, raw fruits and vegetables for 5 days before procedure    6. If you start any NEW medication after your visit today, please notify us. Certain medications (like iron or weight loss medications) will need to be held before the procedure, or the procedure cannot be performed safely.

## 2024-10-25 RX ORDER — ALLOPURINOL 300 MG/1
300 TABLET ORAL DAILY
Refills: 0 | OUTPATIENT
Start: 2024-10-25

## 2024-10-26 DIAGNOSIS — I10 ESSENTIAL HYPERTENSION: ICD-10-CM

## 2024-10-26 NOTE — TELEPHONE ENCOUNTER
Refill Request:    Current Outpatient Medications   Medication Sig Dispense Refill    lisinopril 20 MG Oral Tab Take 1 tablet (20 mg total) by mouth daily. FOR HIGH BLOOD PRESSURE 60 tablet 0     Please advise

## 2024-10-29 RX ORDER — LISINOPRIL 20 MG/1
20 TABLET ORAL DAILY
Qty: 90 TABLET | Refills: 3 | OUTPATIENT
Start: 2024-10-29 | End: 2025-10-24

## 2024-10-29 NOTE — TELEPHONE ENCOUNTER
Please Review. Protocol Failed; No Protocol   BP Readings from Last 1 Encounters:   10/01/24 (!) 144/91     Requested Prescriptions   Pending Prescriptions Disp Refills    lisinopril 20 MG Oral Tab 60 tablet 0     Sig: Take 1 tablet (20 mg total) by mouth daily. FOR HIGH BLOOD PRESSURE       Hypertension Medications Protocol Failed - 10/29/2024 10:19 AM        Failed - Last BP reading less than 140/90     BP Readings from Last 1 Encounters:   10/01/24 (!) 144/91               Passed - CMP or BMP in past 12 months        Passed - In person appointment or virtual visit in the past 12 mos or appointment in next 3 mos     Recent Outpatient Visits              4 weeks ago Skin tag    [facility], [place], [place], PA-C    Office Visit    4 weeks ago Colon cancer screening    [facility]    Office Visit    4 weeks ago Hypogonadism in male    [facility], [facility] [place] Nicolette Cervantes MD    Office Visit    1 month ago Obstructive sleep apnea syndrome    [facility]    Office Visit    3 months ago Encounter for wellness examination in adult    [facility], [facility], [place] APRN    Office Visit          Future Appointments         Provider Department Appt Notes    In 1 month [facility] SLEEP ROOMS [facility] approved 9/13-12/12/24  10/29- lvm to c/b to come in on 10/29 or 10/31  call with cancellations    In 3 months STATHOPOULOS, PROCEDURE [facility], [place] colonoscopy                    Passed - EGFRCR or GFRNAA > 50     GFR Evaluation  EGFRCR: 84 , resulted on 7/27/2024                 Future Appointments         Provider Department Appt Notes    In 1 month [facility] SLEEP ROOMS [facility] approved 9/13-12/12/24  10/29- lvm to c/b to come in on 10/29 or 10/31  call with  cancellations    In 3 months STATHOPOULOS, PROCEDURE [facility] [place] colonoscopy          Recent Outpatient Visits              4 weeks ago [facility] tag    [facility], [place], [place], PA-C    Office Visit    4 weeks ago Colon cancer screening    [facility] [place] Julia Cano APRN    Office Visit    4 weeks ago Hypogonadism in male    [facility], [place] Nicolette Cervantes MD    Office Visit    1 month ago Obstructive sleep apnea syndrome    [facility]    Office Visit    3 months ago Encounter for wellness examination in adult    [facility], [place] APRN    Office Visit

## 2024-11-11 ENCOUNTER — TELEPHONE (OUTPATIENT)
Facility: CLINIC | Age: 49
End: 2024-11-11

## 2024-11-11 NOTE — TELEPHONE ENCOUNTER
1st Reminder letter was sent to patient mychart for orders pending:     US LIVER WITH ELASTOGRAPHY(CPT=76705,30018) (Order #[id number]) on 10/1/24

## 2024-12-02 ENCOUNTER — OFFICE VISIT (OUTPATIENT)
Dept: SLEEP CENTER | Age: 49
End: 2024-12-02
Payer: COMMERCIAL

## 2024-12-02 DIAGNOSIS — G47.33 OBSTRUCTIVE SLEEP APNEA SYNDROME: Primary | ICD-10-CM

## 2024-12-02 PROCEDURE — 95811 POLYSOM 6/>YRS CPAP 4/> PARM: CPT

## 2024-12-04 ENCOUNTER — ORDER TRANSCRIPTION (OUTPATIENT)
Dept: SLEEP CENTER | Age: 49
End: 2024-12-04

## 2024-12-04 DIAGNOSIS — G47.33 OBSTRUCTIVE SLEEP APNEA (ADULT) (PEDIATRIC): Primary | ICD-10-CM

## 2024-12-05 DIAGNOSIS — G47.33 OBSTRUCTIVE SLEEP APNEA (ADULT) (PEDIATRIC): Primary | ICD-10-CM

## 2024-12-05 NOTE — PROGRESS NOTES
1. Obstructive sleep apnea (adult) (pediatric)  Sleep study positive  Titration completed  The patient should be prescribed CPAP at 14 cm H2O, with humidity at 3 and EPR on.   2. The patient was fitted with a ResMed AirFit P10 mask, size Medium.   DME order written with parachute.  - DME - EXTERNAL     MARSHA Douglas

## 2024-12-15 DIAGNOSIS — R06.83 SNORING: ICD-10-CM

## 2024-12-15 DIAGNOSIS — G47.33 OBSTRUCTIVE SLEEP APNEA SYNDROME: ICD-10-CM

## 2024-12-15 DIAGNOSIS — I10 ESSENTIAL HYPERTENSION: ICD-10-CM

## 2024-12-15 DIAGNOSIS — E66.812 CLASS 2 OBESITY WITH BODY MASS INDEX (BMI) OF 35.0 TO 35.9 IN ADULT, UNSPECIFIED OBESITY TYPE, UNSPECIFIED WHETHER SERIOUS COMORBIDITY PRESENT: ICD-10-CM

## 2024-12-15 DIAGNOSIS — E29.1 TESTICULAR HYPOFUNCTION: ICD-10-CM

## 2024-12-15 DIAGNOSIS — E78.2 MIXED HYPERLIPIDEMIA: ICD-10-CM

## 2024-12-15 DIAGNOSIS — R73.9 HYPERGLYCEMIA: ICD-10-CM

## 2024-12-15 DIAGNOSIS — G47.30 SLEEP APNEA, UNSPECIFIED TYPE: ICD-10-CM

## 2024-12-15 DIAGNOSIS — E11.65 UNCONTROLLED TYPE 2 DIABETES MELLITUS WITH HYPERGLYCEMIA (HCC): ICD-10-CM

## 2024-12-15 DIAGNOSIS — E29.1 HYPOGONADISM IN MALE: ICD-10-CM

## 2024-12-16 RX ORDER — ERGOCALCIFEROL 1.25 MG/1
50000 CAPSULE, LIQUID FILLED ORAL WEEKLY
Qty: 12 CAPSULE | Refills: 0 | Status: SHIPPED | OUTPATIENT
Start: 2024-12-16

## 2024-12-16 NOTE — TELEPHONE ENCOUNTER
Endocrine Refill protocol for Ergocalciferol     Protocol Criteria: FAILED Reason: Abnormal labs    If all below requirements are met, send a 90-day supply with 1 refill per provider protocol.    Verify appointment with Endocrinology completed in the last 6 months or scheduled in the next 3 months.  Vitamin D level must have been completed within the last 6 months  Vitamin D level must be within the normal range     Vitamin D, 25OH, Total (ng/mL)   Date Value   07/27/2024 15.9 (L)    (32.0-100.0)    Last completed office visit:9/30/2024 Nicolette Cervantes MD   Next scheduled Follow up:   Future Appointments   Date Time Provider Department Center   1/28/2025  1:00 PM STATHOPMCKENZIEOS, PROCEDURE [facility] None

## 2024-12-26 DIAGNOSIS — E66.812 CLASS 2 OBESITY WITH BODY MASS INDEX (BMI) OF 35.0 TO 35.9 IN ADULT, UNSPECIFIED OBESITY TYPE, UNSPECIFIED WHETHER SERIOUS COMORBIDITY PRESENT: ICD-10-CM

## 2024-12-26 DIAGNOSIS — G47.33 OBSTRUCTIVE SLEEP APNEA SYNDROME: ICD-10-CM

## 2024-12-26 DIAGNOSIS — R73.9 HYPERGLYCEMIA: ICD-10-CM

## 2024-12-26 DIAGNOSIS — E29.1 TESTICULAR HYPOFUNCTION: ICD-10-CM

## 2024-12-26 DIAGNOSIS — G47.30 SLEEP APNEA, UNSPECIFIED TYPE: ICD-10-CM

## 2024-12-26 DIAGNOSIS — R06.83 SNORING: ICD-10-CM

## 2024-12-26 DIAGNOSIS — E29.1 HYPOGONADISM IN MALE: ICD-10-CM

## 2024-12-26 DIAGNOSIS — E11.65 UNCONTROLLED TYPE 2 DIABETES MELLITUS WITH HYPERGLYCEMIA (HCC): ICD-10-CM

## 2024-12-26 DIAGNOSIS — E78.2 MIXED HYPERLIPIDEMIA: ICD-10-CM

## 2024-12-26 DIAGNOSIS — I10 ESSENTIAL HYPERTENSION: ICD-10-CM

## 2024-12-26 RX ORDER — ATORVASTATIN CALCIUM 20 MG/1
20 TABLET, FILM COATED ORAL NIGHTLY
Qty: 90 TABLET | Refills: 0 | Status: SHIPPED | OUTPATIENT
Start: 2024-12-26

## 2024-12-26 NOTE — TELEPHONE ENCOUNTER
Endocrine refill protocol for lipid lowering medications    Protocol Criteria:  FAILED Reason: Abnormal labs    If all below requirements are met, send a 90-day supply with 1 refill per provider protocol.    Verify appointment with Endocrinology completed in the last 6 months or scheduled in the next 3 months.  Lipid panel must have been completed in the last 12 months   ALT result below 80  LDL result below 130    Last completed office visit:9/30/2024 Nicolette Cervantes MD   Next scheduled Follow up:   Future Appointments   Date Time Provider Department Center   1/28/2025  1:00 PM STATHOPMCKENZIEOS, PROCEDURE [facility] None      Last Lipid panel date: Cholesterol: 206, done on 7/27/2024.  HDL Cholesterol: 40, done on 7/27/2024.  TriGlycerides 117, done on 7/27/2024.  LDL Cholesterol: 145, done on 7/27/2024.     Last ALT result: Last ALT was 75 done on 7/27/2024.  Last AST was 31 done on 7/27/2024.

## 2025-01-09 ENCOUNTER — PATIENT MESSAGE (OUTPATIENT)
Dept: FAMILY MEDICINE CLINIC | Facility: CLINIC | Age: 50
End: 2025-01-09

## 2025-01-10 NOTE — TELEPHONE ENCOUNTER
DME order placed 12/5/24 in orders only encounter.   Please assist in follow up for this and ordering.     I called the patient and informed order in system for him, we just need to know where to fax order to. He is going to contact his insurance and see what supply company is covered and send [employer] with info.     Routing to triage support to follow up on  - Patient reports he was notified that order placed or plan for cpap when this was done.   FUENTESI to Bryan. Seems order was placed in orders only encounter dated 12/5/24, not clear if routed.   No need to call patient back on this, I did talk with him and plan will be followed from this [employer] message.      Per report:  IMPRESSIONS: This study demonstrates inadequate CPAP titration with persistent events . Modest events lingered on the final setting and I recommend empiric up titration to a final setting of 14 CWP.   Diagnosis: Obstructive Sleep Apnea G47.33   RECOMMENDATIONS:   1. The patient should be prescribed CPAP at 14 cm H2O, with humidity at 3 and EPR on.   2. The patient was fitted with a ResMed AirFit P10 mask, size Medium.   3. The patient should avoid alcohol and sedative medications, as these may worsen severity of symptoms.   4. The patient should avoid drowsy driving.   5. Patient may follow up with the referring provider.     Thank you for allowing me to participate in this patient's care. Please do not hesitate to contact me with any additional questions.   Electronically signed by Sheldon Ramirez MD   (Electronic Signature)   Board Certified in Sleep Medicine

## 2025-01-17 ENCOUNTER — TELEPHONE (OUTPATIENT)
Dept: FAMILY MEDICINE CLINIC | Facility: CLINIC | Age: 50
End: 2025-01-17

## 2025-01-17 NOTE — TELEPHONE ENCOUNTER
Cpap ordered through [facility] notified patient.    Sleep study 12/04/24    The patient should be prescribed CPAP at 14 cm H2O, with humidity at 3 and EPR on.   2. The patient was fitted with a ResMed AirFit P10 mask, size Medium     Office visit 7/22/24

## 2025-01-23 RX ORDER — SODIUM CHLORIDE, SODIUM LACTATE, POTASSIUM CHLORIDE, CALCIUM CHLORIDE 600; 310; 30; 20 MG/100ML; MG/100ML; MG/100ML; MG/100ML
INJECTION, SOLUTION INTRAVENOUS CONTINUOUS
Status: CANCELLED | OUTPATIENT
Start: 2025-01-23

## 2025-02-05 ENCOUNTER — ANESTHESIA EVENT (OUTPATIENT)
Dept: ENDOSCOPY | Facility: HOSPITAL | Age: 50
End: 2025-02-05
Payer: COMMERCIAL

## 2025-02-05 ENCOUNTER — TELEPHONE (OUTPATIENT)
Facility: CLINIC | Age: 50
End: 2025-02-05

## 2025-02-05 ENCOUNTER — HOSPITAL ENCOUNTER (OUTPATIENT)
Facility: HOSPITAL | Age: 50
Setting detail: HOSPITAL OUTPATIENT SURGERY
Discharge: HOME | End: 2025-02-05
Attending: INTERNAL MEDICINE | Admitting: INTERNAL MEDICINE
Payer: COMMERCIAL

## 2025-02-05 ENCOUNTER — ANESTHESIA (OUTPATIENT)
Dept: ENDOSCOPY | Facility: HOSPITAL | Age: 50
End: 2025-02-05
Payer: COMMERCIAL

## 2025-02-05 VITALS
OXYGEN SATURATION: 97 % | WEIGHT: 255 LBS | HEIGHT: 72 IN | HEART RATE: 65 BPM | SYSTOLIC BLOOD PRESSURE: 152 MMHG | DIASTOLIC BLOOD PRESSURE: 100 MMHG | RESPIRATION RATE: 14 BRPM | BODY MASS INDEX: 34.54 KG/M2

## 2025-02-05 DIAGNOSIS — K62.5 BRBPR (BRIGHT RED BLOOD PER RECTUM): ICD-10-CM

## 2025-02-05 DIAGNOSIS — R19.5 LOOSE STOOLS: ICD-10-CM

## 2025-02-05 DIAGNOSIS — Z12.11 COLON CANCER SCREENING: ICD-10-CM

## 2025-02-05 PROBLEM — Z12.12 SCREENING FOR COLORECTAL CANCER: Status: ACTIVE | Noted: 2025-02-05

## 2025-02-05 LAB — GLUCOSE BLDC GLUCOMTR-MCNC: 115 MG/DL (ref 70–99)

## 2025-02-05 PROCEDURE — 0DJD8ZZ INSPECTION OF LOWER INTESTINAL TRACT, VIA NATURAL OR ARTIFICIAL OPENING ENDOSCOPIC: ICD-10-PCS | Performed by: INTERNAL MEDICINE

## 2025-02-05 PROCEDURE — 45378 DIAGNOSTIC COLONOSCOPY: CPT | Performed by: INTERNAL MEDICINE

## 2025-02-05 RX ORDER — LABETALOL HYDROCHLORIDE 5 MG/ML
INJECTION, SOLUTION INTRAVENOUS AS NEEDED
Status: DISCONTINUED | OUTPATIENT
Start: 2025-02-05 | End: 2025-02-05 | Stop reason: SURG

## 2025-02-05 RX ORDER — NALOXONE HYDROCHLORIDE 0.4 MG/ML
0.08 INJECTION, SOLUTION INTRAMUSCULAR; INTRAVENOUS; SUBCUTANEOUS ONCE AS NEEDED
Status: DISCONTINUED | OUTPATIENT
Start: 2025-02-05 | End: 2025-02-05

## 2025-02-05 RX ORDER — LIDOCAINE HYDROCHLORIDE 10 MG/ML
INJECTION, SOLUTION EPIDURAL; INFILTRATION; INTRACAUDAL; PERINEURAL AS NEEDED
Status: DISCONTINUED | OUTPATIENT
Start: 2025-02-05 | End: 2025-02-05 | Stop reason: SURG

## 2025-02-05 RX ORDER — SODIUM CHLORIDE, SODIUM LACTATE, POTASSIUM CHLORIDE, CALCIUM CHLORIDE 600; 310; 30; 20 MG/100ML; MG/100ML; MG/100ML; MG/100ML
INJECTION, SOLUTION INTRAVENOUS CONTINUOUS
Status: DISCONTINUED | OUTPATIENT
Start: 2025-02-05 | End: 2025-02-05

## 2025-02-05 RX ADMIN — SODIUM CHLORIDE, SODIUM LACTATE, POTASSIUM CHLORIDE, CALCIUM CHLORIDE: 600; 310; 30; 20 INJECTION, SOLUTION INTRAVENOUS at 11:32:00

## 2025-02-05 RX ADMIN — LABETALOL HYDROCHLORIDE 5 MG: 5 INJECTION, SOLUTION INTRAVENOUS at 11:54:00

## 2025-02-05 RX ADMIN — LIDOCAINE HYDROCHLORIDE 50 MG: 10 INJECTION, SOLUTION EPIDURAL; INFILTRATION; INTRACAUDAL; PERINEURAL at 11:32:00

## 2025-02-05 NOTE — TELEPHONE ENCOUNTER
Health maintenance updated.  10 year colonoscopy recall placed in patient outreach.Next due on 02/05/2035 per Dr. Anderson.

## 2025-02-05 NOTE — ANESTHESIA PREPROCEDURE EVALUATION
Anesthesia PreOp Note    HPI:     Yehuda Graves is a 49 year old male who presents for preoperative consultation requested by: Dalton Anderson MD    Date of Surgery: 2/5/2025    Procedure(s):  COLONOSCOPY  Indication: Colon cancer screening/ BRBPR (bright red blood per rectum)/ Loose stools    Relevant Problems   No relevant active problems       NPO:  Last Liquid Consumption Date: 02/05/25  Last Liquid Consumption Time: 0800  Last Solid Consumption Date: 02/04/25  Last Solid Consumption Time: 1200  Last Liquid Consumption Date: 02/05/25          History Review:  Patient Active Problem List    Diagnosis Date Noted    Essential hypertension 06/15/2018    Mixed hyperlipidemia 04/17/2018    Severe obesity (BMI 35.0-35.9 with comorbidity) (HCC) 04/17/2018    Hyperglycemia 04/17/2018    Obstructive sleep apnea syndrome 03/25/2016    Snoring 03/25/2016    Migraine without aura and without status migrainosus, not intractable 03/25/2016    Bronchitis 07/09/2013    Lateral epicondylitis of elbow 07/09/2013    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 No pertinent surgical history.    Prescriptions Prior to Admission[1]  Current Medications and Prescriptions Ordered in Epic[2]    Allergies[3]    Family History   Problem Relation Age of Onset    Cancer Father      Social History     Socioeconomic History    Marital status:    Tobacco Use    Smoking status: Never     Passive exposure: Never    Smokeless tobacco: Never   Vaping Use    Vaping status: Never Used   Substance and Sexual Activity    Alcohol use: Yes     Alcohol/week: 4.0 standard drinks of alcohol     Types: 4 Standard drinks or equivalent per week    Drug use:  Never   Other Topics Concern    Caffeine Concern No    Reaction to local anesthetic No    Pt has a pacemaker No    Pt has a defibrillator No       Available pre-op labs reviewed.             Vital Signs:  Body mass index is 35.13 kg/m².   height is 1.829 m (6') and weight is 117.5 kg (259 lb).   Vitals:    25 1414 25 1119   Weight: 117.5 kg (259 lb) 117.5 kg (259 lb)   Height: 1.829 m (6') 1.829 m (6')        Anesthesia Evaluation     Patient summary reviewed and Nursing notes reviewed    No history of anesthetic complications   Airway   Mallampati: III  TM distance: >3 FB  Neck ROM: full  Dental - Dentition appears grossly intact     Pulmonary - normal exam   (+) sleep apnea  Cardiovascular - normal exam  (+) hypertension    Neuro/Psych - negative ROS     GI/Hepatic/Renal    (+) bowel prep    Endo/Other    (+) diabetes mellitus  Abdominal                  Anesthesia Plan:   ASA:  2  Plan:   MAC  Informed Consent Plan and Risks Discussed With:  Patient      I have informed Yehuda Valleselissa Graves and/or legal guardian or family member of the nature of the anesthetic plan, benefits, risks including possible dental damage if relevant, major complications, and any alternative forms of anesthetic management.   All of the patient's questions were answered to the best of my ability. The patient desires the anesthetic management as planned.  Nam Gillis MD  2025 10:53 AM  Present on Admission:  **None**           [1]   Medications Prior to Admission   Medication Sig Dispense Refill Last Dose/Taking    ATORVASTATIN 20 MG Oral Tab TAKE 1 TABLET BY MOUTH EVERY DAY AT NIGHT 90 tablet 0 Taking    [] lisinopril 20 MG Oral Tab Take 1 tablet (20 mg total) by mouth daily. FOR HIGH BLOOD PRESSURE 60 tablet 0 Taking    metFORMIN  MG Oral Tablet 24 Hr Take 1 tablet (500 mg total) by mouth 2 (two) times daily with meals. 180 tablet 1 Taking    amLODIPine 2.5 MG Oral Tab Take 1 tablet (2.5 mg total) by  mouth daily.   Taking    [] polyethylene glycol, PEG 3350-KCl-NaBcb-NaCl-NaSulf, 236 g Oral Recon Soln Take 4,000 mL by mouth once for 1 dose. May substitute prescription with Golytely/Nulytely/Gavilyte and or generic equivalent, if needed. Take bowel preparation as provided by gastroenterology office, or visit our website at [email or web address] 4000 mL 0     polyethylene glycol, PEG 3350-KCl-NaBcb-NaCl-NaSulf, 236 g Oral Recon Soln Take 4,000 mL by mouth As Directed. Take 2,000 mL the night before your procedure and 2,000 mL the morning of your procedure. Take as directed by GI clinic. Okay to substitute for generic. 1 each 0     colchicine 0.6 MG Oral Tab Take 1 tablet (0.6 mg total) by mouth as needed.       allopurinol 300 MG Oral Tab Take 1 tablet (300 mg total) by mouth as needed.      [2]   Current Facility-Administered Medications Ordered in Epic   Medication Dose Route Frequency Provider Last Rate Last Admin    lactated ringers infusion   Intravenous Continuous Dalton Anderson MD         No current [facility]-ordered outpatient medications on file.   [3] No Known Allergies

## 2025-02-05 NOTE — ANESTHESIA POSTPROCEDURE EVALUATION
Patient: Yehuda Graves    Procedure Summary       Date: 02/05/25 Room / Location: [facility] ENDOSCOPY 01 / [facility] ENDOSCOPY    Anesthesia Start: 1132 Anesthesia Stop: 1201    Procedure: COLONOSCOPY Diagnosis:       Colon cancer screening      BRBPR (bright red blood per rectum)      Loose stools      (Internal hemorrhoids)    Surgeons: Dalton Anderson MD Anesthesiologist: Nam Gillis MD    Anesthesia Type: MAC ASA Status: 2            Anesthesia Type: MAC    Vitals Value Taken Time   /89 02/05/25 1201   Temp  02/05/25 1201   Pulse 73 02/05/25 1201   Resp 14 02/05/25 1201   SpO2 97 02/05/25 1201       [facility] AN Post Evaluation:   Patient Evaluated in PACU  Patient Participation: complete - patient participated  Level of Consciousness: awake and alert  Pain Management: adequate  Airway Patency:patent  Yes    Nausea/Vomiting: none  Cardiovascular Status: acceptable  Respiratory Status: acceptable and room air  Postoperative Hydration acceptable      Nam Gillis MD  2/5/2025 12:01 PM

## 2025-02-05 NOTE — OPERATIVE REPORT
[facility] Endoscopy Report      Date of Procedure:  02/05/25      Preoperative Diagnosis:  Colorectal cancer screening      Postoperative Diagnosis:  Internal hemorrhoids      Procedure:    Screening colonoscopy      Surgeon:  Dalton Anderson M.D.      Anesthesia:  Monitored anesthesia care  Cecal withdrawal time: 17 minutes  EBL: None      Brief History:  This is a 49 year old male who presents for a screening colonoscopy.  Other than occasional loose stools and contact rectal bleeding the patient is asymptomatic from a lower gastrointestinal tract standpoint and of average risk for colorectal cancer.      Technique:  After informed consent, the patient was placed in the left lateral recumbent position.  Digital rectal examination revealed no palpable intraluminal abnormalities.  An Olympus variable stiffness 190 series HD colonoscope was inserted into the rectum and advanced under direct vision by following the lumen to the terminal ileum.  The colon was examined upon withdrawal in the left lateral recumbent position.      Findings:  The preparation of the colon was excellent.  The terminal ileum was examined for 5 cm and visually normal.  The ileocecal valve was well preserved. The visualized colonic mucosa from the cecum to the anal verge was normal with an intact vascular pattern. There were no colonic polyps, definite diverticula, mass lesions, vascular anomalies or signs of inflammation seen.  Retroflexion in the rectum revealed internal hemorrhoids.  The procedure was well tolerated without immediate complication.      Impression:  1.  Internal hemorrhoids  2.  Normal screening colonoscopy to the terminal ileum    Recommendations:  In the absence of any new symptoms or signs repeat screening colonoscopy in 10 years.        Dalton Anderson MD  2/5/2025  12:00 PM

## 2025-02-05 NOTE — DISCHARGE INSTRUCTIONS
Home Care Instructions for Colonoscopy  Diet:  - Resume your regular diet as tolerated unless otherwise instructed.  - Start with light meals to minimize bloating.  - Do not drink alcohol today.    Medication:  - If you have questions about resuming your normal medications, please contact your Primary Care Physician.    Activities:  - Take it easy today. Do not return to work today.  - Do not drive today.  - Do not operate any machinery today (including kitchen equipment).    Colonoscopy:  - You may notice some rectal \"spotting\" (a little blood on the toilet tissue) for a day or two after the exam. This is normal.  - If you experience any rectal bleeding (not spotting), persistent tenderness or sharp severe abdominal pains, oral temperature over 100 degrees Fahrenheit, light-headedness or dizziness, or any other problems, contact your doctor.      **If unable to reach your doctor, please go to the [facility] Emergency Room**    - Your referring physician will receive a full report of your examination.  - If you do not hear from your doctor's office within two weeks of your biopsy, please call them for your results.    You may be able to see your laboratory results in [employer] between 4 and 7 business days.  In some cases, your physician may not have viewed the results before they are released to [employer].  If you have questions regarding your results contact the physician who ordered the test/exam by phone or via [employer] by choosing \"Ask a Medical Question.\"

## 2025-02-05 NOTE — H&P
History & Physical Examination    Patient Name: Yheuda Graves  MRN: [id number]  [facility]: [id number]  YOB: 1975    Diagnosis: Colorectal cancer screening      Prescriptions Prior to Admission[1]  Current Facility-Administered Medications   Medication Dose Route Frequency    lactated ringers infusion   Intravenous Continuous       Allergies: Allergies[2]    Past Medical History:    Diabetes (HCC)    Essential hypertension    Gout    Heart murmur    High blood pressure    High cholesterol    Hx of motion sickness    Migraines    Sleep apnea    Visual impairment     History reviewed. No pertinent surgical history.  Family History   Problem Relation Age of Onset    Cancer Father      Social History     Tobacco Use    Smoking status: Never     Passive exposure: Never    Smokeless tobacco: Never   Substance Use Topics    Alcohol use: Yes     Alcohol/week: 4.0 standard drinks of alcohol     Types: 4 Standard drinks or equivalent per week       SYSTEM Check if Review is Normal Check if Physical Exam is Normal If not normal, please explain:   HEENT [X ] [ X]    NECK  [X ] [ X]    HEART [X ] [ X]    LUNGS [X ] [ X]    ABDOMEN [X ] [ X]    EXTREMITIES [X ] [ X]    OTHER        [ x ] I have discussed the risks and benefits and alternatives with the patient/family.  They understand and agree to proceed with plan of care.  [ x ] I have reviewed the History and Physical done within the last 30 days.  Any changes noted above.    Dalton Anderson MD  2025  11:32 AM             [1]   Medications Prior to Admission   Medication Sig Dispense Refill Last Dose/Taking    ATORVASTATIN 20 MG Oral Tab TAKE 1 TABLET BY MOUTH EVERY DAY AT NIGHT 90 tablet 0 2025    [] lisinopril 20 MG Oral Tab Take 1 tablet (20 mg total) by mouth daily. FOR HIGH BLOOD PRESSURE 60 tablet 0 2025    metFORMIN  MG Oral Tablet 24 Hr Take 1 tablet (500 mg total) by mouth 2 (two) times daily with meals. 180 tablet 1  2025    amLODIPine 2.5 MG Oral Tab Take 1 tablet (2.5 mg total) by mouth daily.   2025    [] polyethylene glycol, PEG 3350-KCl-NaBcb-NaCl-NaSulf, 236 g Oral Recon Soln Take 4,000 mL by mouth once for 1 dose. May substitute prescription with Golytely/Nulytely/Gavilyte and or generic equivalent, if needed. Take bowel preparation as provided by gastroenterology office, or visit our website at [email or web address] 4000 mL 0     polyethylene glycol, PEG 3350-KCl-NaBcb-NaCl-NaSulf, 236 g Oral Recon Soln Take 4,000 mL by mouth As Directed. Take 2,000 mL the night before your procedure and 2,000 mL the morning of your procedure. Take as directed by GI clinic. Okay to substitute for generic. 1 each 0     colchicine 0.6 MG Oral Tab Take 1 tablet (0.6 mg total) by mouth as needed.       allopurinol 300 MG Oral Tab Take 1 tablet (300 mg total) by mouth as needed.   2025   [2] No Known Allergies

## 2025-02-18 ENCOUNTER — PATIENT MESSAGE (OUTPATIENT)
Dept: FAMILY MEDICINE CLINIC | Facility: CLINIC | Age: 50
End: 2025-02-18

## 2025-02-27 NOTE — PROGRESS NOTES
Subjective:   Yehuda Graves is a 50 year old male who presents for Follow - Up ( discuss lab results (A1c, vit D & cholesterol) refill: amlodipine & allopurinol )   Patient is a pleasant 50-year-old male with past medical history consistent for obesity, hypertension, hyperlipidemia, migraine, DM2, vitamin D deficiency, and YULIYA.  Patient presents to office today for review of labs and medication refills.  Patient recently moved back from [facility] was there for 5 years.  Patient states he followed up with endocrine for low tesosterone and he was also assisted with the management of his vitamin D deficiency, his new diabetes, and his hyperlipidemia.  Patient presents to office today as a follow-up needing refills of lisinopril and amlodipine and gout medication.  Since our last visit patient is turned 50 years of age he has made diet lifestyle changes.  We will check prostate screening today.  We will check urine screen for microalbumin creatinine for his diabetes.  We will check vitamin D level for resolution of vitamin D deficiency.  Patient was advised to follow-up with endocrine for management of diabetes and obtaining labs.  Patient was also recently diagnosed with obstructive sleep apnea he had will be picking up CPAP tomorrow.    Recent colonoscopy 2/5/2025        Past Medical History:    Diabetes (HCC)    Essential hypertension    Gout    Heart murmur    High blood pressure    High cholesterol    Hx of motion sickness    Migraines    Sleep apnea    Visual impairment      Past Surgical History:   Procedure Laterality Date    Colonoscopy N/A 2/5/2025    Procedure: COLONOSCOPY;  Surgeon: Dalton Anderson MD;  Location: [facility] ENDOSCOPY        History/Other:    Chief Complaint Reviewed and Verified  Nursing Notes Reviewed and   Verified  Tobacco Reviewed  Allergies Reviewed  Medications Reviewed    Problem List Reviewed  Medical History Reviewed  Surgical History   Reviewed  Family History  Reviewed  Social History Reviewed         Tobacco:  He has never smoked tobacco.    Current Outpatient Medications   Medication Sig Dispense Refill    amLODIPine 2.5 MG Oral Tab Take 1 tablet (2.5 mg total) by mouth daily. 90 tablet 1    lisinopril 20 MG Oral Tab Take 1 tablet (20 mg total) by mouth daily. 90 tablet 1    ATORVASTATIN 20 MG Oral Tab TAKE 1 TABLET BY MOUTH EVERY DAY AT NIGHT 90 tablet 0    metFORMIN  MG Oral Tablet 24 Hr Take 1 tablet (500 mg total) by mouth 2 (two) times daily with meals. 180 tablet 1    colchicine 0.6 MG Oral Tab Take 1 tablet (0.6 mg total) by mouth as needed.      allopurinol 300 MG Oral Tab Take 1 tablet (300 mg total) by mouth as needed. (Patient not taking: Reported on 2/28/2025)           Review of Systems:  Review of Systems   Constitutional:  Negative for chills and fever.   Respiratory:  Negative for cough and shortness of breath.    Cardiovascular:  Negative for chest pain.         Objective:   /81 (BP Location: Right arm, Patient Position: Sitting, Cuff Size: large)   Pulse 66   Ht 6' (1.829 m)   Wt 256 lb 3.2 oz (116.2 kg)   SpO2 97%   BMI 34.75 kg/m²  Estimated body mass index is 34.75 kg/m² as calculated from the following:    Height as of this encounter: 6' (1.829 m).    Weight as of this encounter: 256 lb 3.2 oz (116.2 kg).  Physical Exam  Vitals and nursing note reviewed.   Constitutional:       Appearance: Normal appearance. He is obese.   HENT:      Head: Normocephalic and atraumatic.      Right Ear: External ear normal.      Left Ear: External ear normal.   Cardiovascular:      Rate and Rhythm: Normal rate and regular rhythm.      Pulses: Normal pulses.      Heart sounds: Normal heart sounds. No murmur heard.  Pulmonary:      Effort: Pulmonary effort is normal. No respiratory distress.      Breath sounds: Normal breath sounds.   Abdominal:      General: Abdomen is flat. Bowel sounds are normal.      Palpations: Abdomen is soft.   Skin:      General: Skin is warm and dry.      Capillary Refill: Capillary refill takes less than 2 seconds.   Neurological:      Mental Status: He is alert and oriented to person, place, and time.           Assessment & Plan:   1. Essential hypertension (Primary)  -     amLODIPine Besylate; Take 1 tablet (2.5 mg total) by mouth daily.  Dispense: 90 tablet; Refill: 1  -     Lisinopril; Take 1 tablet (20 mg total) by mouth daily.  Dispense: 90 tablet; Refill: 1  2. Chronic gout without tophus, unspecified cause, unspecified site  -     Uric Acid; Future; Expected date: 02/28/2025  3. Vitamin D deficiency  -     Vitamin D; Future; Expected date: 02/28/2025  4. Elevated blood pressure reading  5. Prostate cancer screening  -     PSA Total, Screen; Future; Expected date: 02/28/2025  6. Obstructive sleep apnea syndrome  7. Type 2 diabetes mellitus without complication, without long-term current use of insulin (HCC)  -     Microalb/Creat Ratio, Random Urine; Future; Expected date: 02/28/2025    1. Essential hypertension  Stable on current regimen.  Blood pressure in office today 137/81 off of amlodipine.  Refills provided for both medications  Check microalbumin creatinine  CPM  - amLODIPine 2.5 MG Oral Tab; Take 1 tablet (2.5 mg total) by mouth daily.  Dispense: 90 tablet; Refill: 1  - lisinopril 20 MG Oral Tab; Take 1 tablet (20 mg total) by mouth daily.  Dispense: 90 tablet; Refill: 1    2. Chronic gout without tophus, unspecified cause, unspecified site  History of chronic gout  Was previously on allopurinol 300 mg daily  Has never received refill since moving back to [place]  Has been out of medication for 3 months advised checking uric acid level and seeing if still needed at this time    - Uric Acid; Future    3. Vitamin D deficiency  Patient was vitamin D deficient on wellness labs in 07/24  Corrected by ergocalciferol once weekly x 12 weeks.  No retest completed.  Recheck vitamin D level today.  - Vitamin D; Future    4.  Elevated blood pressure reading  Blood pressure initially elevated in office today on initial assessment.  Blood pressure normalized when resting in chair x 5 minutes.  Continue present management    5. Prostate cancer screening  Patient had recently had 50th birthday obtaining blood work we will also obtain PSA screening  - PSA (Screening) [E]; Future    6. Obstructive sleep apnea syndrome  Patient was recently diagnosed with obstructive sleep apnea  He underwent CPAP titration  DME has been submitted and patient will  tomorrow    7. Type 2 diabetes mellitus without complication, without long-term current use of insulin (HCC)  Patient being managed by endocrine  On statin, on ACE inhibitor, reinforced need for annual ophthalmology and podiatry evaluation.  Check microalbumin creatinine  - Microalb/Creat Ratio, Random Urine; Future    Patient aware of plan of care. All questions answered to satisfaction of the patient. Patient instructed to call office or reach out via [employer] if any issues arise. For urgent issues and/or reviewed red flags please proceed to the urgent care or ER.  Also, inform the nurse practitioner with any new symptoms or medication side effects.          Return in about 3 months (around 5/28/2025) for Annual physical.    MARSHA Douglas, 2/27/2025, 12:34 PM

## 2025-02-28 ENCOUNTER — LAB ENCOUNTER (OUTPATIENT)
Dept: LAB | Age: 50
End: 2025-02-28
Payer: COMMERCIAL

## 2025-02-28 ENCOUNTER — OFFICE VISIT (OUTPATIENT)
Dept: FAMILY MEDICINE CLINIC | Facility: CLINIC | Age: 50
End: 2025-02-28
Payer: COMMERCIAL

## 2025-02-28 VITALS
SYSTOLIC BLOOD PRESSURE: 137 MMHG | HEART RATE: 66 BPM | OXYGEN SATURATION: 97 % | HEIGHT: 72 IN | WEIGHT: 256.19 LBS | DIASTOLIC BLOOD PRESSURE: 81 MMHG | BODY MASS INDEX: 34.7 KG/M2

## 2025-02-28 DIAGNOSIS — E66.812 CLASS 2 OBESITY WITH BODY MASS INDEX (BMI) OF 35.0 TO 35.9 IN ADULT, UNSPECIFIED OBESITY TYPE, UNSPECIFIED WHETHER SERIOUS COMORBIDITY PRESENT: ICD-10-CM

## 2025-02-28 DIAGNOSIS — E29.1 TESTICULAR HYPOFUNCTION: ICD-10-CM

## 2025-02-28 DIAGNOSIS — M1A.9XX0 CHRONIC GOUT WITHOUT TOPHUS, UNSPECIFIED CAUSE, UNSPECIFIED SITE: ICD-10-CM

## 2025-02-28 DIAGNOSIS — E11.65 UNCONTROLLED TYPE 2 DIABETES MELLITUS WITH HYPERGLYCEMIA (HCC): ICD-10-CM

## 2025-02-28 DIAGNOSIS — R73.09 HIGH GLUCOSE LEVEL: ICD-10-CM

## 2025-02-28 DIAGNOSIS — E55.9 VITAMIN D DEFICIENCY: ICD-10-CM

## 2025-02-28 DIAGNOSIS — R03.0 ELEVATED BLOOD PRESSURE READING: ICD-10-CM

## 2025-02-28 DIAGNOSIS — E11.9 TYPE 2 DIABETES MELLITUS WITHOUT COMPLICATION, WITHOUT LONG-TERM CURRENT USE OF INSULIN (HCC): ICD-10-CM

## 2025-02-28 DIAGNOSIS — E78.2 MIXED HYPERLIPIDEMIA: ICD-10-CM

## 2025-02-28 DIAGNOSIS — G47.33 OBSTRUCTIVE SLEEP APNEA SYNDROME: ICD-10-CM

## 2025-02-28 DIAGNOSIS — I10 ESSENTIAL HYPERTENSION: Primary | ICD-10-CM

## 2025-02-28 DIAGNOSIS — R06.83 SNORING: ICD-10-CM

## 2025-02-28 DIAGNOSIS — I10 ESSENTIAL HYPERTENSION: ICD-10-CM

## 2025-02-28 DIAGNOSIS — Z12.5 PROSTATE CANCER SCREENING: ICD-10-CM

## 2025-02-28 DIAGNOSIS — R73.9 HYPERGLYCEMIA: ICD-10-CM

## 2025-02-28 DIAGNOSIS — E29.1 HYPOGONADISM IN MALE: ICD-10-CM

## 2025-02-28 DIAGNOSIS — E55.9 VITAMIN D INSUFFICIENCY: Primary | ICD-10-CM

## 2025-02-28 DIAGNOSIS — G47.30 SLEEP APNEA, UNSPECIFIED TYPE: ICD-10-CM

## 2025-02-28 LAB
ALBUMIN SERPL-MCNC: 4.4 G/DL (ref 3.2–4.8)
ALBUMIN/GLOB SERPL: 1.5 {RATIO} (ref 1–2)
ALP LIVER SERPL-CCNC: 74 U/L
ALT SERPL-CCNC: 53 U/L
ANION GAP SERPL CALC-SCNC: 7 MMOL/L (ref 0–18)
AST SERPL-CCNC: 29 U/L (ref ?–34)
BILIRUB SERPL-MCNC: 0.6 MG/DL (ref 0.3–1.2)
BUN BLD-MCNC: 13 MG/DL (ref 9–23)
BUN/CREAT SERPL: 11.9 (ref 10–20)
CALCIUM BLD-MCNC: 9.3 MG/DL (ref 8.7–10.4)
CHLORIDE SERPL-SCNC: 104 MMOL/L (ref 98–112)
CHOLEST SERPL-MCNC: 210 MG/DL (ref ?–200)
CO2 SERPL-SCNC: 29 MMOL/L (ref 21–32)
COMPLEXED PSA SERPL-MCNC: 0.42 NG/ML (ref ?–4)
CREAT BLD-MCNC: 1.09 MG/DL
CREAT UR-SCNC: 114.1 MG/DL
EGFRCR SERPLBLD CKD-EPI 2021: 83 ML/MIN/1.73M2 (ref 60–?)
ESTRADIOL SERPL-MCNC: 40.7 PG/ML
FASTING PATIENT LIPID ANSWER: YES
FASTING STATUS PATIENT QL REPORTED: YES
FSH SERPL-ACNC: 3.9 MIU/ML
GLOBULIN PLAS-MCNC: 3 G/DL (ref 2–3.5)
GLUCOSE BLD-MCNC: 112 MG/DL (ref 70–99)
HDLC SERPL-MCNC: 39 MG/DL (ref 40–59)
LDLC SERPL CALC-MCNC: 150 MG/DL (ref ?–100)
LH SERPL-ACNC: 2.2 MIU/ML
MICROALBUMIN UR-MCNC: <0.3 MG/DL
NONHDLC SERPL-MCNC: 171 MG/DL (ref ?–130)
OSMOLALITY SERPL CALC.SUM OF ELEC: 291 MOSM/KG (ref 275–295)
POTASSIUM SERPL-SCNC: 3.9 MMOL/L (ref 3.5–5.1)
PROLACTIN SERPL-MCNC: 4.2 NG/ML
PROT SERPL-MCNC: 7.4 G/DL (ref 5.7–8.2)
SODIUM SERPL-SCNC: 140 MMOL/L (ref 136–145)
T4 FREE SERPL-MCNC: 1.2 NG/DL (ref 0.8–1.7)
TRIGL SERPL-MCNC: 115 MG/DL (ref 30–149)
TSI SER-ACNC: 1.33 UIU/ML (ref 0.55–4.78)
URATE SERPL-MCNC: 7.3 MG/DL
VIT D+METAB SERPL-MCNC: 29.3 NG/ML (ref 30–100)
VLDLC SERPL CALC-MCNC: 22 MG/DL (ref 0–30)

## 2025-02-28 PROCEDURE — 82570 ASSAY OF URINE CREATININE: CPT

## 2025-02-28 PROCEDURE — 80061 LIPID PANEL: CPT

## 2025-02-28 PROCEDURE — 82043 UR ALBUMIN QUANTITATIVE: CPT

## 2025-02-28 PROCEDURE — 80053 COMPREHEN METABOLIC PANEL: CPT

## 2025-02-28 PROCEDURE — 82306 VITAMIN D 25 HYDROXY: CPT

## 2025-02-28 PROCEDURE — 84443 ASSAY THYROID STIM HORMONE: CPT | Performed by: INTERNAL MEDICINE

## 2025-02-28 PROCEDURE — 99214 OFFICE O/P EST MOD 30 MIN: CPT

## 2025-02-28 PROCEDURE — 84439 ASSAY OF FREE THYROXINE: CPT | Performed by: INTERNAL MEDICINE

## 2025-02-28 PROCEDURE — 84146 ASSAY OF PROLACTIN: CPT | Performed by: INTERNAL MEDICINE

## 2025-02-28 PROCEDURE — 36415 COLL VENOUS BLD VENIPUNCTURE: CPT | Performed by: INTERNAL MEDICINE

## 2025-02-28 PROCEDURE — 84305 ASSAY OF SOMATOMEDIN: CPT | Performed by: INTERNAL MEDICINE

## 2025-02-28 PROCEDURE — 84550 ASSAY OF BLOOD/URIC ACID: CPT

## 2025-02-28 PROCEDURE — 83001 ASSAY OF GONADOTROPIN (FSH): CPT

## 2025-02-28 PROCEDURE — 84410 TESTOSTERONE BIOAVAILABLE: CPT | Performed by: INTERNAL MEDICINE

## 2025-02-28 PROCEDURE — 82670 ASSAY OF TOTAL ESTRADIOL: CPT

## 2025-02-28 PROCEDURE — 83002 ASSAY OF GONADOTROPIN (LH): CPT

## 2025-02-28 RX ORDER — ERGOCALCIFEROL 1.25 MG/1
50000 CAPSULE, LIQUID FILLED ORAL WEEKLY
Qty: 8 CAPSULE | Refills: 0 | Status: SHIPPED | OUTPATIENT
Start: 2025-02-28 | End: 2025-04-19

## 2025-02-28 RX ORDER — AMLODIPINE BESYLATE 2.5 MG/1
2.5 TABLET ORAL DAILY
Qty: 90 TABLET | Refills: 1 | Status: SHIPPED | OUTPATIENT
Start: 2025-02-28 | End: 2025-08-27

## 2025-02-28 RX ORDER — LISINOPRIL 20 MG/1
20 TABLET ORAL DAILY
Qty: 90 TABLET | Refills: 1 | Status: SHIPPED | OUTPATIENT
Start: 2025-02-28 | End: 2025-08-27

## 2025-03-01 ENCOUNTER — TELEPHONE (OUTPATIENT)
Dept: ENDOCRINOLOGY CLINIC | Facility: CLINIC | Age: 50
End: 2025-03-01

## 2025-03-01 NOTE — PROGRESS NOTES
1. Vitamin D insufficiency  Hx of vitamin d def  Vitamin d rechecked  Remains deficient  Start once weekly x8 weeks  Recheck vitamin D 3 months   - ergocalciferol 1.25 MG (62170 UT) Oral Cap; Take 1 capsule (50,000 Units total) by mouth once a week for 8 doses.  Dispense: 8 capsule; Refill: 0    2. Type 2 diabetes mellitus without complication, without long-term current use of insulin (HCC)  Stable on current regimen  Check A1c    MARSHA Douglas

## 2025-03-04 LAB
IGF I: 123 NG/ML
IGF-1, Z SCORE: -0.7 S.D.

## 2025-03-06 LAB
SEX HORM BIND GLOB: 23 NMOL/L
TESTOST % FREE+WEAK BND: 27 %
TESTOST FREE+WEAK BND: 58.9 NG/DL
TESTOSTERONE TOT /MS: 218.3 NG/DL

## 2025-03-21 ENCOUNTER — HOSPITAL ENCOUNTER (OUTPATIENT)
Dept: ULTRASOUND IMAGING | Facility: HOSPITAL | Age: 50
Discharge: HOME | End: 2025-03-21
Payer: COMMERCIAL

## 2025-03-21 DIAGNOSIS — R74.01 ELEVATED ALT MEASUREMENT: ICD-10-CM

## 2025-03-21 PROCEDURE — 76705 ECHO EXAM OF ABDOMEN: CPT

## 2025-03-21 PROCEDURE — 76981 USE PARENCHYMA: CPT

## 2025-03-26 DIAGNOSIS — E78.2 MIXED HYPERLIPIDEMIA: ICD-10-CM

## 2025-03-26 DIAGNOSIS — R06.83 SNORING: ICD-10-CM

## 2025-03-26 DIAGNOSIS — E66.812 CLASS 2 OBESITY WITH BODY MASS INDEX (BMI) OF 35.0 TO 35.9 IN ADULT, UNSPECIFIED OBESITY TYPE, UNSPECIFIED WHETHER SERIOUS COMORBIDITY PRESENT: ICD-10-CM

## 2025-03-26 DIAGNOSIS — G47.30 SLEEP APNEA, UNSPECIFIED TYPE: ICD-10-CM

## 2025-03-26 DIAGNOSIS — E29.1 TESTICULAR HYPOFUNCTION: ICD-10-CM

## 2025-03-26 DIAGNOSIS — I10 ESSENTIAL HYPERTENSION: ICD-10-CM

## 2025-03-26 DIAGNOSIS — E11.65 UNCONTROLLED TYPE 2 DIABETES MELLITUS WITH HYPERGLYCEMIA (HCC): ICD-10-CM

## 2025-03-26 DIAGNOSIS — E29.1 HYPOGONADISM IN MALE: ICD-10-CM

## 2025-03-26 DIAGNOSIS — G47.33 OBSTRUCTIVE SLEEP APNEA SYNDROME: ICD-10-CM

## 2025-03-26 DIAGNOSIS — R73.9 HYPERGLYCEMIA: ICD-10-CM

## 2025-03-26 RX ORDER — METFORMIN HYDROCHLORIDE 500 MG/1
500 TABLET, EXTENDED RELEASE ORAL 2 TIMES DAILY WITH MEALS
Qty: 180 TABLET | Refills: 1 | Status: SHIPPED | OUTPATIENT
Start: 2025-03-26

## 2025-03-26 NOTE — TELEPHONE ENCOUNTER
Endocrine Refill protocol for metformin    Protocol Criteria:  FAILED  Reason: No labs completed in required time frame    If all below requirements are met, send a 90-day supply with 1 refill per provider protocol.     Verify appointment with Endocrinology completed in the last 6 months or scheduled in the next 3 months.  Verify A1C has been completed within the last 6 months and is below 8.5%  Verify last GFR is greater than or equal to 40 in the past 12 months    Last completed office visit:9/30/2024 Nicolette Cervantes MD   Next scheduled Follow up:   Future Appointments   Date Time Provider Department Center   6/16/2025  5:15 PM Nicolette Cervantes MD [facility]              Last GFR result:    Lab Results   Component Value Date    EGFRCR 83 02/28/2025     Last A1c result: Last A1C result: 7.7% done 7/27/2024.

## 2025-03-27 ENCOUNTER — TELEPHONE (OUTPATIENT)
Facility: CLINIC | Age: 50
End: 2025-03-27

## 2025-03-27 DIAGNOSIS — E11.65 UNCONTROLLED TYPE 2 DIABETES MELLITUS WITH HYPERGLYCEMIA (HCC): ICD-10-CM

## 2025-03-27 DIAGNOSIS — G47.33 OBSTRUCTIVE SLEEP APNEA SYNDROME: ICD-10-CM

## 2025-03-27 DIAGNOSIS — R73.9 HYPERGLYCEMIA: ICD-10-CM

## 2025-03-27 DIAGNOSIS — E66.812 CLASS 2 OBESITY WITH BODY MASS INDEX (BMI) OF 35.0 TO 35.9 IN ADULT, UNSPECIFIED OBESITY TYPE, UNSPECIFIED WHETHER SERIOUS COMORBIDITY PRESENT: ICD-10-CM

## 2025-03-27 DIAGNOSIS — G47.30 SLEEP APNEA, UNSPECIFIED TYPE: ICD-10-CM

## 2025-03-27 DIAGNOSIS — I10 ESSENTIAL HYPERTENSION: ICD-10-CM

## 2025-03-27 DIAGNOSIS — E29.1 TESTICULAR HYPOFUNCTION: ICD-10-CM

## 2025-03-27 DIAGNOSIS — E55.9 VITAMIN D INSUFFICIENCY: ICD-10-CM

## 2025-03-27 DIAGNOSIS — K74.00 LIVER FIBROSIS: Primary | ICD-10-CM

## 2025-03-27 DIAGNOSIS — E78.2 MIXED HYPERLIPIDEMIA: ICD-10-CM

## 2025-03-27 DIAGNOSIS — R06.83 SNORING: ICD-10-CM

## 2025-03-27 DIAGNOSIS — E29.1 HYPOGONADISM IN MALE: ICD-10-CM

## 2025-03-27 RX ORDER — ATORVASTATIN CALCIUM 20 MG/1
20 TABLET, FILM COATED ORAL NIGHTLY
Qty: 90 TABLET | Refills: 0 | Status: SHIPPED | OUTPATIENT
Start: 2025-03-27

## 2025-03-27 NOTE — TELEPHONE ENCOUNTER
Refill Request:    Current Outpatient Medications   Medication Sig Dispense Refill    ergocalciferol 1.25 MG (52192 UT) Oral Cap Take 1 capsule (50,000 Units total) by mouth once a week for 8 doses. 8 capsule 0     Please advise

## 2025-03-27 NOTE — TELEPHONE ENCOUNTER
Endocrine refill protocol for lipid lowering medications    Protocol Criteria:  PASSED Reason: N/A    If all below requirements are met, send a 90-day supply with 1 refill per provider protocol.    Verify appointment with Endocrinology completed in the last 6 months or scheduled in the next 3 months.  Lipid panel must have been completed in the last 12 months   ALT result below 80  LDL result below 130    Last completed office visit:9/30/2024 Nicolette Cervantes MD   Next scheduled Follow up:   Future Appointments   Date Time Provider Department Center   6/16/2025  5:15 PM Nicolette Cervantes MD [facility]   7/25/2025 11:20 AM [facility]      Last Lipid panel date: Cholesterol: 210, done on 2/28/2025.  HDL Cholesterol: 39, done on 2/28/2025.  TriGlycerides 115, done on 2/28/2025.  LDL Cholesterol: 150, done on 2/28/2025.     Last ALT result: Last ALT was 53 done on 2/28/2025.  Last AST was 29 done on 2/28/2025.

## 2025-03-27 NOTE — TELEPHONE ENCOUNTER
Left message to call back.    Julia Cano, APRN       3/27/25 12:12 PM  Note     +fibrosis on ultrasound  Voicemail left & mychart message sent.  Hepatology referral placed  Needs repeat US for gallbladder polyps in 6-12 months

## 2025-03-27 NOTE — TELEPHONE ENCOUNTER
+fibrosis on ultrasound  Voicemail left & mychart message sent.  Hepatology referral placed  Needs repeat US for gallbladder polyps in 6-12 months

## 2025-03-31 NOTE — TELEPHONE ENCOUNTER
Fax confirmation received at 8:44 am.    The patient read APN message and is aware to call Dr Aguilera's office to schedule.

## 2025-04-01 RX ORDER — ERGOCALCIFEROL 1.25 MG/1
50000 CAPSULE, LIQUID FILLED ORAL WEEKLY
Qty: 8 CAPSULE | Refills: 0 | OUTPATIENT
Start: 2025-04-01 | End: 2025-05-21

## 2025-04-01 NOTE — TELEPHONE ENCOUNTER
Lab comment on 02/28/2025:  Yehuda,  D level remains insufficient.  Restart ergocalciferol x 8 weeks and recheck in 3 months.  Uric acid level was normal will not renew allopurinol at this time.  Can check yearly.  Prostate level is also normal.  When you have a chance would like to recheck A1c. Apologies I didn't realize endocrine had not ordered. No rush. Have a nice weekend!  Bryan    Sent [employer] message to patient to complete labs in May.

## 2025-07-05 DIAGNOSIS — E66.812 CLASS 2 OBESITY WITH BODY MASS INDEX (BMI) OF 35.0 TO 35.9 IN ADULT, UNSPECIFIED OBESITY TYPE, UNSPECIFIED WHETHER SERIOUS COMORBIDITY PRESENT: ICD-10-CM

## 2025-07-05 DIAGNOSIS — G47.30 SLEEP APNEA, UNSPECIFIED TYPE: ICD-10-CM

## 2025-07-05 DIAGNOSIS — G47.33 OBSTRUCTIVE SLEEP APNEA SYNDROME: ICD-10-CM

## 2025-07-05 DIAGNOSIS — R73.9 HYPERGLYCEMIA: ICD-10-CM

## 2025-07-05 DIAGNOSIS — I10 ESSENTIAL HYPERTENSION: ICD-10-CM

## 2025-07-05 DIAGNOSIS — E78.2 MIXED HYPERLIPIDEMIA: ICD-10-CM

## 2025-07-05 DIAGNOSIS — E29.1 TESTICULAR HYPOFUNCTION: ICD-10-CM

## 2025-07-05 DIAGNOSIS — R06.83 SNORING: ICD-10-CM

## 2025-07-05 DIAGNOSIS — E29.1 HYPOGONADISM IN MALE: ICD-10-CM

## 2025-07-05 DIAGNOSIS — E11.65 UNCONTROLLED TYPE 2 DIABETES MELLITUS WITH HYPERGLYCEMIA (HCC): ICD-10-CM

## 2025-07-07 RX ORDER — ATORVASTATIN CALCIUM 20 MG/1
20 TABLET, FILM COATED ORAL NIGHTLY
Qty: 30 TABLET | Refills: 2 | Status: SHIPPED | OUTPATIENT
Start: 2025-07-07

## 2025-07-07 NOTE — TELEPHONE ENCOUNTER
Endocrine refill protocol for lipid lowering medications    Protocol Criteria:  PASSED Reason: N/A    If all below requirements are met, send a 90-day supply with 1 refill per provider protocol.    Verify appointment with Endocrinology completed in the last 6 months or scheduled in the next 3 months.  Lipid panel must have been completed in the last 12 months   ALT result below 80  LDL result below 130    Last completed office visit:9/30/2024 Nicolette Cervantes MD   Last completed telemed visit: Visit date not found  Next scheduled Follow up:   Future Appointments   Date Time Provider Department Center   7/25/2025 11:20 AM Bryan Bray APRN [facility]      Last Lipid panel date: Cholesterol: 210, done on 2/28/2025.  HDL Cholesterol: 39, done on 2/28/2025.  TriGlycerides 115, done on 2/28/2025.  LDL Cholesterol: 150, done on 2/28/2025.     Last ALT result: Last ALT was 53 done on 2/28/2025.  Last AST was 29 done on 2/28/2025.

## 2025-07-21 ENCOUNTER — LAB ENCOUNTER (OUTPATIENT)
Dept: LAB | Age: 50
End: 2025-07-21
Payer: COMMERCIAL

## 2025-07-21 DIAGNOSIS — E11.9 TYPE 2 DIABETES MELLITUS WITHOUT COMPLICATION, WITHOUT LONG-TERM CURRENT USE OF INSULIN (HCC): ICD-10-CM

## 2025-07-21 DIAGNOSIS — E55.9 VITAMIN D INSUFFICIENCY: ICD-10-CM

## 2025-07-21 LAB
EST. AVERAGE GLUCOSE BLD GHB EST-MCNC: 171 MG/DL (ref 68–126)
HBA1C MFR BLD: 7.6 % (ref ?–5.7)
VIT D+METAB SERPL-MCNC: 28.2 NG/ML (ref 30–100)

## 2025-07-21 PROCEDURE — 82306 VITAMIN D 25 HYDROXY: CPT

## 2025-07-21 PROCEDURE — 83036 HEMOGLOBIN GLYCOSYLATED A1C: CPT

## 2025-07-21 PROCEDURE — 36415 COLL VENOUS BLD VENIPUNCTURE: CPT

## 2025-07-24 NOTE — PROGRESS NOTES
Subjective:   Yehuda Graves is a 50 year old male who presents for Physical (Lab order,)   Patient is a pleasant 50-year-old male with past medical history consistent for obesity, hypertension, hyperlipidemia, migraine, hypogonadism, and YULIYA.  Patient presents to office today for annual physical. Recent labs also show uncontrolled DM and no follow up with endocrine.     Lab Results       Component                Value               Date                       A1C                      7.6 (H)             07/21/2025                 A1C                      7.7 (H)             07/27/2024                 A1C                      6.9 (H)             02/19/2021                 A1C                      6.3 (H)             12/28/2019                 A1C                      6.4 (H)             04/17/2018               Not taking metformin for last 2 months. Will continue at 500 mg bid. Will recheck at 3 month. Advised to follow up with endocrine. Referral for ophthalmology and podiatry provided.     Patient states his health is ok. Knows he is overweight. Has appt with hepatologist in October. Needs to schedule US for repeat.      Diet: Has good weeks and bad weeks. Has been better this year. Mostly water. Cut back on eating out.   Exercise: He has started walking with dog twice daily. Educated on recommendations.    Sleep: Has sleep apnea. 8 hours per night. Wakes up fatigued. Off and on with machine. Just got new mask will be trying this. Will trial full face mask since nasal pillow dislodged.   Stress: Sold house, living with mom while looking for home, postponing for another year. Living with mom going well. Has been walking more for stress relief.   Social: , empty marilia 3 daughters 28/26/24. Lives in [place]  Sexually Active: Yes  Prophylaxis: No, tubal ligation wife x 24 years.   Alcohol: 5 drinks per week. Nothing in excess.   Tobacco: no never, no marijuana  Work: Truck company dispatcher, director  of safety.   Vaccinations: shingles and prevnar declined. Will research.   Colon: 2/5/25 Stathopolous, Internal hemorrhoids, recall 10 years   Podiatrist: referral today  Ophthalmologist: referral today.           Past Medical History[1]   Past Surgical History[2]     History/Other:    Chief Complaint Reviewed and Verified  Nursing Notes Reviewed and   Verified  Tobacco Reviewed  Allergies Reviewed  Medications Reviewed    Problem List Reviewed  Medical History Reviewed  Surgical History   Reviewed  Family History Reviewed  Social History Reviewed         Tobacco:  He has never smoked tobacco.    Current Medications[3]      Review of Systems:  Review of Systems   Constitutional: Negative.  Negative for activity change, chills and fever.   HENT: Negative.  Negative for congestion, ear pain, postnasal drip, sinus pain, sore throat and trouble swallowing.    Respiratory: Negative.  Negative for cough, shortness of breath and wheezing.    Cardiovascular: Negative.  Negative for chest pain and leg swelling.   Gastrointestinal: Negative.  Negative for abdominal pain, blood in stool, constipation, diarrhea, nausea and vomiting.   Endocrine: Negative.    Genitourinary: Negative.  Negative for difficulty urinating, dysuria and flank pain.   Musculoskeletal: Negative.  Negative for arthralgias, back pain and neck stiffness.   Skin: Negative.  Negative for color change and rash.   Neurological: Negative.  Negative for dizziness and headaches.   Hematological:  Negative for adenopathy.         Objective:   /88   Pulse 74   Ht 6' (1.829 m)   Wt 259 lb 12.8 oz (117.8 kg)   SpO2 97%   BMI 35.24 kg/m²  Estimated body mass index is 35.24 kg/m² as calculated from the following:    Height as of this encounter: 6' (1.829 m).    Weight as of this encounter: 259 lb 12.8 oz (117.8 kg).  Physical Exam  Vitals and nursing note reviewed.   Constitutional:       Appearance: Normal appearance. He is obese.   HENT:       Head: Normocephalic and atraumatic.      Right Ear: Tympanic membrane, ear canal and external ear normal. There is no impacted cerumen.      Left Ear: Tympanic membrane, ear canal and external ear normal. There is no impacted cerumen.      Nose: Nose normal. No congestion or rhinorrhea.      Mouth/Throat:      Mouth: Mucous membranes are moist.      Pharynx: No oropharyngeal exudate or posterior oropharyngeal erythema.   Eyes:      Extraocular Movements: Extraocular movements intact.      Pupils: Pupils are equal, round, and reactive to light.   Cardiovascular:      Rate and Rhythm: Normal rate and regular rhythm.      Pulses: Normal pulses.      Heart sounds: Normal heart sounds. No murmur heard.  Pulmonary:      Effort: Pulmonary effort is normal. No respiratory distress.      Breath sounds: Normal breath sounds. No wheezing.   Abdominal:      General: There is no distension.      Palpations: Abdomen is soft.      Tenderness: There is no abdominal tenderness.   Musculoskeletal:         General: Normal range of motion.      Cervical back: Normal range of motion and neck supple.      Right lower leg: No edema.      Left lower leg: No edema.   Feet:      Comments: Bilateral barefoot skin diabetic exam is normal, visualized feet and the appearance is normal.  Bilateral monofilament/sensation of both feet is normal.  Pulsation pedal pulse exam of both lower legs/feet is normal as well.       Lymphadenopathy:      Cervical: No cervical adenopathy.   Skin:     General: Skin is warm and dry.      Capillary Refill: Capillary refill takes less than 2 seconds.      Findings: No rash.   Neurological:      General: No focal deficit present.      Mental Status: He is alert and oriented to person, place, and time.   Psychiatric:         Mood and Affect: Mood normal.         Behavior: Behavior normal.           Assessment & Plan:   1. Encounter for wellness examination in adult (Primary)  -     CBC With Differential With  Platelet; Future; Expected date: 07/25/2025  -     Comp Metabolic Panel (14); Future; Expected date: 07/25/2025  -     Lipid Panel; Future; Expected date: 07/25/2025  -     TSH W Reflex To Free T4; Future; Expected date: 07/25/2025  -     EKG 12 Lead; Future; Expected date: 07/25/2025  2. Uncontrolled type 2 diabetes mellitus with hyperglycemia (HCC)  -     metFORMIN HCl ER; Take 1 tablet (500 mg total) by mouth 2 (two) times daily with meals.  Dispense: 180 tablet; Refill: 1  -     Comp Metabolic Panel (14); Future; Expected date: 07/25/2025  -     Lipid Panel; Future; Expected date: 07/25/2025  3. Mixed hyperlipidemia  -     metFORMIN HCl ER; Take 1 tablet (500 mg total) by mouth 2 (two) times daily with meals.  Dispense: 180 tablet; Refill: 1  -     Lipid Panel; Future; Expected date: 07/25/2025  4. Essential hypertension  -     CBC With Differential With Platelet; Future; Expected date: 07/25/2025  -     Comp Metabolic Panel (14); Future; Expected date: 07/25/2025  -     EKG 12 Lead; Future; Expected date: 07/25/2025  5. Class 2 obesity with body mass index (BMI) of 35.0 to 35.9 in adult, unspecified obesity type, unspecified whether serious comorbidity present  -     metFORMIN HCl ER; Take 1 tablet (500 mg total) by mouth 2 (two) times daily with meals.  Dispense: 180 tablet; Refill: 1  6. Vitamin D insufficiency  -     Vitamin D (Ergocalciferol); Take 1 capsule (50,000 Units total) by mouth once a week for 8 doses.  Dispense: 8 capsule; Refill: 0  -     Vitamin D; Future; Expected date: 10/24/2025  7. Chronic gout without tophus, unspecified cause, unspecified site  -     Uric Acid; Future; Expected date: 07/25/2025  8. Need for vaccination    1. Encounter for wellness examination in adult  Wellness labs ordered.  Encouraged increasing physical activity which includes raising heart rate 3 to 5 days/week for at least 30 minutes at a time, while also performing mild strength exercises.  Patient counseled on  importance of dietary modifications, which may include limiting fats, red meat, and carbohydrates, while increasing fruit and vegetable intake, and trying to adhere to a low sodium/Mediterranean diet.  Encouraged to manage stress.  Encouraged good sleep habits.  Encouraged good sexual health.    2. Uncontrolled type 2 diabetes mellitus with hyperglycemia (HCC)  Lab Results   Component Value Date    A1C 7.6 (H) 07/21/2025    A1C 7.7 (H) 07/27/2024    A1C 6.9 (H) 02/19/2021    A1C 6.3 (H) 12/28/2019    A1C 6.4 (H) 04/17/2018   Uncontrolled DM with A1c 7.6  Admits was not taking metformin for last few months  Made dietary changes   Restart metformin to 500 mg twice daily.  Microalbumin creatinine urine test ordered, normal   Podiatry referral for diabetic foot exam, educated on annual need.  Ophthalmology referral for diabetic eye exam, educated on annual need.  Stable on Statin for CV protection  Stable on Ace Inhibitor for renal protection  Recheck A1c 6 months    3. Mixed hyperlipidemia  The 10-year ASCVD risk score (Dino REEVES, et al., 2019) is: 12%    Values used to calculate the score:      Age: 50 years      Sex: Male      Is Non- : No      Diabetic: Yes      Tobacco smoker: No      Systolic Blood Pressure: 138 mmHg      Is BP treated: Yes      HDL Cholesterol: 39 mg/dL      Total Cholesterol: 210 mg/dL  Stable on statin  Cpm  Check fasting lipids    4. Essential hypertension  BP in office today 138/88  Continue lisinopril 20 mg  Continue amlodipine 2.5 mg  Check EKG  Advised DASH, if remains borderline elevated at 6 months increase amlodipine to 5 mg daily   -Discussed with patient about how to monitor blood pressure at home and continue medication daily as prescribed   -Advised low salt diet. Aim for less than 2 grams daily  -Eat less of canned, frozen, dried, packaged and fast foods.  -Limit caffeine, alcohol.   -No smoking.  -Exercise regularly, at least 30 minutes 3-5 times a  week.  -Maintain healthy body weight.   -Avoid stress.    5. Class 2 obesity with body mass index (BMI) of 35.0 to 35.9 in adult, unspecified obesity type, unspecified whether serious comorbidity present  -BMI in office today 35.24  -Recommend low fat/low cholesterol diet and mediterranean diet  -Advised to decrease the amount of carbohydrates in diet (bread products, rice, pasta, potatoes, cereals, starchy vegetables)  -Avoid fried, fatty, greasy foods, cheese, red-meats, egg yolks and processed foods  -Recommend to cut out foods and beverages that contain high sugars.  -Increase your vegetable intake.  -Consider over the counter metamucil once daily with 8 oz water to lower cholesterol   -Try to exercise at least 30 minutes per day 3-5 times per week with mixture of cardio and weight training.     6. Vitamin D insufficiency  Lab Results   Component Value Date    VITD 28.2 (L) 07/21/2025     Start ergocalciferol once weekly x 8 weeks  Recheck Vitamin D level following  No need for refill     7. Chronic gout without tophus, unspecified cause, unspecified site  Off meds  Check uric acid with wellness labs  - Uric Acid; Future    8. Need for vaccination  Declined prevnar and shingrix  Education provided  Will address at next physical     Patient aware of plan of care. All questions answered to satisfaction of the patient. Patient instructed to call office or reach out via [facility] if any issues arise. For urgent issues and/or reviewed red flags please proceed to the urgent care or ER.  Also, inform the nurse practitioner with any new symptoms or medication side effects.        Return in about 6 months (around 1/25/2026).    MARSHA Douglas, 7/24/2025, 12:15 PM          [1]   Past Medical History:   Diabetes (HCC)    Essential hypertension    Gout    Heart murmur    High blood pressure    High cholesterol    Hx of motion sickness    Migraines    Sleep apnea    Visual impairment   [2]   Past Surgical  History:  Procedure Laterality Date    Colonoscopy N/A 2/5/2025    Procedure: COLONOSCOPY;  Surgeon: Dalton Anderson MD;  Location: [facility] ENDOSCOPY   [3]   Current Outpatient Medications   Medication Sig Dispense Refill    metFORMIN  MG Oral Tablet 24 Hr Take 1 tablet (500 mg total) by mouth 2 (two) times daily with meals. 180 tablet 1    ergocalciferol 1.25 MG (74405 UT) Oral Cap Take 1 capsule (50,000 Units total) by mouth once a week for 8 doses. 8 capsule 0    ATORVASTATIN 20 MG Oral Tab TAKE 1 TABLET BY MOUTH EVERY DAY AT NIGHT 30 tablet 2    amLODIPine 2.5 MG Oral Tab Take 1 tablet (2.5 mg total) by mouth daily. 90 tablet 1    lisinopril 20 MG Oral Tab Take 1 tablet (20 mg total) by mouth daily. 90 tablet 1

## 2025-07-25 ENCOUNTER — OFFICE VISIT (OUTPATIENT)
Dept: FAMILY MEDICINE CLINIC | Facility: CLINIC | Age: 50
End: 2025-07-25

## 2025-07-25 VITALS
HEIGHT: 72 IN | HEART RATE: 74 BPM | DIASTOLIC BLOOD PRESSURE: 88 MMHG | SYSTOLIC BLOOD PRESSURE: 138 MMHG | WEIGHT: 259.81 LBS | BODY MASS INDEX: 35.19 KG/M2 | OXYGEN SATURATION: 97 %

## 2025-07-25 DIAGNOSIS — E55.9 VITAMIN D INSUFFICIENCY: ICD-10-CM

## 2025-07-25 DIAGNOSIS — Z00.00 ENCOUNTER FOR WELLNESS EXAMINATION IN ADULT: Primary | ICD-10-CM

## 2025-07-25 DIAGNOSIS — M1A.9XX0 CHRONIC GOUT WITHOUT TOPHUS, UNSPECIFIED CAUSE, UNSPECIFIED SITE: ICD-10-CM

## 2025-07-25 DIAGNOSIS — E66.812 CLASS 2 OBESITY WITH BODY MASS INDEX (BMI) OF 35.0 TO 35.9 IN ADULT, UNSPECIFIED OBESITY TYPE, UNSPECIFIED WHETHER SERIOUS COMORBIDITY PRESENT: ICD-10-CM

## 2025-07-25 DIAGNOSIS — E78.2 MIXED HYPERLIPIDEMIA: ICD-10-CM

## 2025-07-25 DIAGNOSIS — E11.65 UNCONTROLLED TYPE 2 DIABETES MELLITUS WITH HYPERGLYCEMIA (HCC): ICD-10-CM

## 2025-07-25 DIAGNOSIS — I10 ESSENTIAL HYPERTENSION: ICD-10-CM

## 2025-07-25 DIAGNOSIS — Z23 NEED FOR VACCINATION: ICD-10-CM

## 2025-07-25 PROCEDURE — 99396 PREV VISIT EST AGE 40-64: CPT

## 2025-07-25 RX ORDER — ERGOCALCIFEROL 1.25 MG/1
50000 CAPSULE, LIQUID FILLED ORAL WEEKLY
Qty: 8 CAPSULE | Refills: 0 | Status: SHIPPED | OUTPATIENT
Start: 2025-07-25 | End: 2025-09-13

## 2025-07-25 RX ORDER — METFORMIN HYDROCHLORIDE 500 MG/1
500 TABLET, EXTENDED RELEASE ORAL 2 TIMES DAILY WITH MEALS
Qty: 180 TABLET | Refills: 1 | Status: SHIPPED | OUTPATIENT
Start: 2025-07-25

## 2025-07-31 ENCOUNTER — MED REC SCAN ONLY (OUTPATIENT)
Dept: FAMILY MEDICINE CLINIC | Facility: CLINIC | Age: 50
End: 2025-07-31

## (undated) DEVICE — 60 ML SYRINGE REGULAR TIP: Brand: MONOJECT

## (undated) DEVICE — V2 SPECIMEN COLLECTION MANIFOLD KIT: Brand: NEPTUNE

## (undated) DEVICE — Device

## (undated) DEVICE — KIT ENDO ORCAPOD 160/180/190

## (undated) DEVICE — KIT CLEAN ENDOKIT 1.1OZ GOWNX2

## (undated) DEVICE — MEDI-VAC NON-CONDUCTIVE SUCTION TUBING 6MM X 1.8M (6FT.) L: Brand: CARDINAL HEALTH

## (undated) NOTE — LETTER
[facility]  [address], [place], [place]    Authorization for Surgical Operation and Procedure                               I hereby authorize Dalton Anderson MD, my physician and his/her assistants (if applicable), which may include medical students, residents, and/or fellows, to perform the following surgical operation/ procedure and administer such anesthesia as may be determined necessary by my physician: Operation/Procedure name (s) COLONOSCOPY on Yehuda Dexterelissa Graves   2.   I recognize that during the surgical operation/procedure, unforeseen conditions may necessitate additional or different procedures than those listed above.  I, therefore, further authorize and request that the above-named surgeon, assistants, or designees perform such procedures as are, in their judgment, necessary and desirable.    3.   My surgeon/physician has discussed prior to my surgery the potential benefits, risks and side effects of this procedure; the likelihood of achieving goals; and potential problems that might occur during recuperation.  They also discussed reasonable alternatives to the procedure, including risks, benefits, and side effects related to the alternatives and risks related to not receiving this procedure.  I have had all my questions answered and I acknowledge that no guarantee has been made as to the result that may be obtained.    4.   Should the need arise during my operation/procedure, which includes change of level of care prior to discharge, I also consent to the administration of blood and/or blood products.  Further, I understand that despite careful testing and screening of blood or blood products by collecting agencies, I may still be subject to ill effects as a result of receiving a blood transfusion and/or blood products.  The following are some, but not all, of the potential risks that can occur: fever and allergic reactions, hemolytic reactions, transmission of diseases  such as Hepatitis, AIDS and Cytomegalovirus (CMV) and fluid overload.  In the event that I wish to have an autologous transfusion of my own blood, or a directed donor transfusion, I will discuss this with my physician.  Check only if Refusing Blood or Blood Products  I understand refusal of blood or blood products as deemed necessary by my physician may have serious consequences to my condition to include possible death. I hereby assume responsibility for my refusal and release the hospital, its personnel, and my physicians from any responsibility for the consequences of my refusal.    o  Refuse   5.   I authorize the use of any specimen, organs, tissues, body parts or foreign objects that may be removed from my body during the operation/procedure for diagnosis, research or teaching purposes and their subsequent disposal by hospital authorities.  I also authorize the release of specimen test results and/or written reports to my treating physician on the hospital medical staff or other referring or consulting physicians involved in my care, at the discretion of the Pathologist or my treating physician.    6.   I consent to the photographing or videotaping of the operations or procedures to be performed, including appropriate portions of my body for medical, scientific, or educational purposes, provided my identity is not revealed by the pictures or by descriptive texts accompanying them.  If the procedure has been photographed/videotaped, the surgeon will obtain the original picture, image, videotape or CD.  The hospital will not be responsible for storage, release or maintenance of the picture, image, tape or CD.    7.   I consent to the presence of a  or observers in the operating room as deemed necessary by my physician or their designees.    8.   I recognize that in the event my procedure results in extended X-Ray/fluoroscopy time, I may develop a skin reaction.    9. If I have a Do Not Attempt  Resuscitation (DNAR) order in place, that status will be suspended while in the operating room, procedural suite, and during the recovery period unless otherwise explicitly stated by me (or a person authorized to consent on my behalf). The surgeon or my attending physician will determine when the applicable recovery period ends for purposes of reinstating the DNAR order.  10. Patients having a sterilization procedure: I understand that if the procedure is successful the results will be permanent and it will therefore be impossible for me to inseminate, conceive, or bear children.  I also understand that the procedure is intended to result in sterility, although the result has not been guaranteed.   11. I acknowledge that my physician has explained sedation/analgesia administration to me including the risk and benefits I consent to the administration of sedation/analgesia as may be necessary or desirable in the judgment of my physician.    I CERTIFY THAT I HAVE READ AND FULLY UNDERSTAND THE ABOVE CONSENT TO OPERATION and/or OTHER PROCEDURE.     ____________________________________  _________________________________        ______________________________  Signature of Patient    Signature of Responsible Person                Printed Name of Responsible Person                                      ____________________________________  _____________________________                ________________________________  Signature of Witness        Date  Time         Relationship to Patient    STATEMENT OF PHYSICIAN My signature below affirms that prior to the time of the procedure; I have explained to the patient and/or his/her legal representative, the risks and benefits involved in the proposed treatment and any reasonable alternative to the proposed treatment. I have also explained the risks and benefits involved in refusal of the proposed treatment and alternatives to the proposed treatment and have answered the patient's  questions. If I have a significant financial interest in a co-management agreement or a significant financial interest in any product or implant, or other significant relationship used in this procedure/surgery, I have disclosed this and had a discussion with my patient.     _____________________________________________________              _____________________________  (Signature of Physician)                                                                                         (Date)                                   (Time)  Patient Name: Yehuda Graves      : 1975      Printed: 2025     Medical Record #: [id number]                                      Page 1 of 1

## (undated) NOTE — LETTER
[facility]  Administration of Anesthesia  I, Yehuda Graves agree to be cared for by a physician anesthesiologist alone and/or with a nurse anesthetist, who is specially trained to monitor me and give me medicine to put me to sleep or keep me comfortable during my procedure    I understand that my anesthesiologist and/or anesthetist is not an employee or agent of [facility] or [employer]. He or she works for [facility]    As the patient asking for anesthesia services, I agree to:  Allow the anesthesiologist (anesthesia doctor) to give me medicine and do additional procedures as necessary. Some examples are: Starting or using an “IV” to give me medicine, fluids or blood during my procedure, and having a breathing tube placed to help me breathe when I’m asleep (intubation). In the event that my heart stops working properly, I understand that my anesthesiologist will make every effort to sustain my life, unless otherwise directed by [facility] Do Not Resuscitate documents.  Tell my anesthesia doctor before my procedure:  If I am pregnant.  The last time that I ate or drank.  iii. All of the medicines I take (including prescriptions, herbal supplements, and pills I can buy without a prescription (including street drugs/illegal medications). Failure to inform my anesthesiologist about these medicines may increase my risk of anesthetic complications.  iv.If I am allergic to anything or have had a reaction to anesthesia before.  I understand how the anesthesia medicine will help me (benefits).  I understand that with any type of anesthesia medicine there are risks:  The most common risks are: nausea, vomiting, sore throat, muscle soreness, damage to my eyes, mouth, or teeth (from breathing tube placement).  Rare risks include: remembering what happened during my procedure, allergic reactions to medications, injury to my airway, heart, lungs, vision, nerves, or  muscles and in extremely rare instances death.  My doctor has explained to me other choices available to me for my care (alternatives).  Pregnant Patients (“epidural”):  I understand that the risks of having an epidural (medicine given into my back to help control pain during labor), include itching, low blood pressure, difficulty urinating, headache or slowing of the baby’s heart. Very rare risks include infection, bleeding, seizure, irregular heart rhythms and nerve injury.  Regional Anesthesia (“spinal”, “epidural”, & “nerve blocks”):  I understand that rare but potential complications include headache, bleeding, infection, seizure, irregular heart rhythms, and nerve injury.    _____________________________________________________________________________  Patient (or Representative) Signature/Relationship to Patient  Date   Time    _____________________________________________________________________________   Name (if used)    Language/Organization   Time    _____________________________________________________________________________  Nurse Anesthetist Signature     Date   Time  _____________________________________________________________________________  Anesthesiologist Signature     Date   Time  I have discussed the procedure and information above with the patient (or patient’s representative) and answered their questions. The patient or their representative has agreed to have anesthesia services.    _____________________________________________________________________________  Witness        Date   Time  I have verified that the signature is that of the patient or patient’s representative, and that it was signed before the procedure  Patient Name: Yehuda Graves     : 1975                 Printed: 2025 at 8:01 AM    Medical Record #: [id number]                                            Page 1 of 1  ----------ANESTHESIA CONSENT----------

## (undated) NOTE — LETTER
1/26/2021              [address]         Dear Yair Conte,    This letter is to inform you that our office has made several attempts to reach you by phone without success.   We were attempting to contact yo

## (undated) NOTE — LETTER
11/11/2024          Yehuda Graves    [address] [facility] [address]         Dear Yehuda,    Our records indicate that the tests ordered for you by MARSHA Morin  have not been done.  If you have, in fact, already completed the tests or you do not wish to have the tests done, please contact our office at THE NUMBER LISTED BELOW.  Otherwise, please proceed with the testing.  Enclosed is a duplicate order for your convenience.    US LIVER WITH ELASTOGRAPHY(CPT=[id number]) (Order #[id number]) on 10/1/24   To schedule a test, call Central Scheduling at [phone]    Sincerely,    MARSHA Morin  [address]  [facility] [address] [phone]

## (undated) NOTE — LETTER
[facility]  Administration of Anesthesia  I, Yehuda Graves agree to be cared for by a physician anesthesiologist alone and/or with a nurse anesthetist, who is specially trained to monitor me and give me medicine to put me to sleep or keep me comfortable during my procedure    I understand that my anesthesiologist and/or anesthetist is not an employee or agent of [facility] or [employer]. He or she works for [facility] [facility]    As the patient asking for anesthesia services, I agree to:  Allow the anesthesiologist (anesthesia doctor) to give me medicine and do additional procedures as necessary. Some examples are: Starting or using an “IV” to give me medicine, fluids or blood during my procedure, and having a breathing tube placed to help me breathe when I’m asleep (intubation). In the event that my heart stops working properly, I understand that my anesthesiologist will make every effort to sustain my life, unless otherwise directed by [facility] Do Not Resuscitate documents.  Tell my anesthesia doctor before my procedure:  If I am pregnant.  The last time that I ate or drank.  iii. All of the medicines I take (including prescriptions, herbal supplements, and pills I can buy without a prescription (including street drugs/illegal medications). Failure to inform my anesthesiologist about these medicines may increase my risk of anesthetic complications.  iv.If I am allergic to anything or have had a reaction to anesthesia before.  I understand how the anesthesia medicine will help me (benefits).  I understand that with any type of anesthesia medicine there are risks:  The most common risks are: nausea, vomiting, sore throat, muscle soreness, damage to my eyes, mouth, or teeth (from breathing tube placement).  Rare risks include: remembering what happened during my procedure, allergic reactions to medications, injury to my airway, heart, lungs, vision, nerves, or  muscles and in extremely rare instances death.  My doctor has explained to me other choices available to me for my care (alternatives).  Pregnant Patients (“epidural”):  I understand that the risks of having an epidural (medicine given into my back to help control pain during labor), include itching, low blood pressure, difficulty urinating, headache or slowing of the baby’s heart. Very rare risks include infection, bleeding, seizure, irregular heart rhythms and nerve injury.  Regional Anesthesia (“spinal”, “epidural”, & “nerve blocks”):  I understand that rare but potential complications include headache, bleeding, infection, seizure, irregular heart rhythms, and nerve injury.    _____________________________________________________________________________  Patient (or Representative) Signature/Relationship to Patient  Date   Time    _____________________________________________________________________________   Name (if used)    Language/Organization   Time    _____________________________________________________________________________  Nurse Anesthetist Signature     Date   Time  _____________________________________________________________________________  Anesthesiologist Signature     Date   Time  I have discussed the procedure and information above with the patient (or patient’s representative) and answered their questions. The patient or their representative has agreed to have anesthesia services.    _____________________________________________________________________________  Witness        Date   Time  I have verified that the signature is that of the patient or patient’s representative, and that it was signed before the procedure  Patient Name: Yehuda Graves     : 1975                 Printed: 2025 at 2:20 PM    Medical Record #: [id number]                                            Page 1 of 1  ----------ANESTHESIA CONSENT----------

## (undated) NOTE — LETTER
[facility]  [address], [place], [place]    Authorization for Surgical Operation and Procedure                               I hereby authorize Dalton Anderson MD, my physician and his/her assistants (if applicable), which may include medical students, residents, and/or fellows, to perform the following surgical operation/ procedure and administer such anesthesia as may be determined necessary by my physician: Operation/Procedure name (s) COLONOSCOPY on Yehuda Dexterelissa Graves   2.   I recognize that during the surgical operation/procedure, unforeseen conditions may necessitate additional or different procedures than those listed above.  I, therefore, further authorize and request that the above-named surgeon, assistants, or designees perform such procedures as are, in their judgment, necessary and desirable.    3.   My surgeon/physician has discussed prior to my surgery the potential benefits, risks and side effects of this procedure; the likelihood of achieving goals; and potential problems that might occur during recuperation.  They also discussed reasonable alternatives to the procedure, including risks, benefits, and side effects related to the alternatives and risks related to not receiving this procedure.  I have had all my questions answered and I acknowledge that no guarantee has been made as to the result that may be obtained.    4.   Should the need arise during my operation/procedure, which includes change of level of care prior to discharge, I also consent to the administration of blood and/or blood products.  Further, I understand that despite careful testing and screening of blood or blood products by collecting agencies, I may still be subject to ill effects as a result of receiving a blood transfusion and/or blood products.  The following are some, but not all, of the potential risks that can occur: fever and allergic reactions, hemolytic reactions, transmission of diseases  such as Hepatitis, AIDS and Cytomegalovirus (CMV) and fluid overload.  In the event that I wish to have an autologous transfusion of my own blood, or a directed donor transfusion, I will discuss this with my physician.  Check only if Refusing Blood or Blood Products  I understand refusal of blood or blood products as deemed necessary by my physician may have serious consequences to my condition to include possible death. I hereby assume responsibility for my refusal and release the hospital, its personnel, and my physicians from any responsibility for the consequences of my refusal.    o  Refuse   5.   I authorize the use of any specimen, organs, tissues, body parts or foreign objects that may be removed from my body during the operation/procedure for diagnosis, research or teaching purposes and their subsequent disposal by hospital authorities.  I also authorize the release of specimen test results and/or written reports to my treating physician on the hospital medical staff or other referring or consulting physicians involved in my care, at the discretion of the Pathologist or my treating physician.    6.   I consent to the photographing or videotaping of the operations or procedures to be performed, including appropriate portions of my body for medical, scientific, or educational purposes, provided my identity is not revealed by the pictures or by descriptive texts accompanying them.  If the procedure has been photographed/videotaped, the surgeon will obtain the original picture, image, videotape or CD.  The hospital will not be responsible for storage, release or maintenance of the picture, image, tape or CD.    7.   I consent to the presence of a  or observers in the operating room as deemed necessary by my physician or their designees.    8.   I recognize that in the event my procedure results in extended X-Ray/fluoroscopy time, I may develop a skin reaction.    9. If I have a Do Not Attempt  Resuscitation (DNAR) order in place, that status will be suspended while in the operating room, procedural suite, and during the recovery period unless otherwise explicitly stated by me (or a person authorized to consent on my behalf). The surgeon or my attending physician will determine when the applicable recovery period ends for purposes of reinstating the DNAR order.  10. Patients having a sterilization procedure: I understand that if the procedure is successful the results will be permanent and it will therefore be impossible for me to inseminate, conceive, or bear children.  I also understand that the procedure is intended to result in sterility, although the result has not been guaranteed.   11. I acknowledge that my physician has explained sedation/analgesia administration to me including the risk and benefits I consent to the administration of sedation/analgesia as may be necessary or desirable in the judgment of my physician.    I CERTIFY THAT I HAVE READ AND FULLY UNDERSTAND THE ABOVE CONSENT TO OPERATION and/or OTHER PROCEDURE.     ____________________________________  _________________________________        ______________________________  Signature of Patient    Signature of Responsible Person                Printed Name of Responsible Person                                      ____________________________________  _____________________________                ________________________________  Signature of Witness        Date  Time         Relationship to Patient    STATEMENT OF PHYSICIAN My signature below affirms that prior to the time of the procedure; I have explained to the patient and/or his/her legal representative, the risks and benefits involved in the proposed treatment and any reasonable alternative to the proposed treatment. I have also explained the risks and benefits involved in refusal of the proposed treatment and alternatives to the proposed treatment and have answered the patient's  questions. If I have a significant financial interest in a co-management agreement or a significant financial interest in any product or implant, or other significant relationship used in this procedure/surgery, I have disclosed this and had a discussion with my patient.     _____________________________________________________              _____________________________  (Signature of Physician)                                                                                         (Date)                                   (Time)  Patient Name: Yehuda Graves      : 1975      Printed: 2025     Medical Record #: [id number]                                      Page 1 of 1